# Patient Record
Sex: FEMALE | Race: OTHER | HISPANIC OR LATINO | URBAN - METROPOLITAN AREA
[De-identification: names, ages, dates, MRNs, and addresses within clinical notes are randomized per-mention and may not be internally consistent; named-entity substitution may affect disease eponyms.]

---

## 2021-07-30 PROBLEM — Z00.00 ENCOUNTER FOR PREVENTIVE HEALTH EXAMINATION: Status: ACTIVE | Noted: 2021-07-30

## 2021-07-31 ENCOUNTER — OUTPATIENT (OUTPATIENT)
Dept: OUTPATIENT SERVICES | Facility: HOSPITAL | Age: 71
LOS: 1 days | End: 2021-07-31
Payer: MEDICARE

## 2021-07-31 ENCOUNTER — APPOINTMENT (OUTPATIENT)
Dept: CT IMAGING | Facility: HOSPITAL | Age: 71
End: 2021-07-31
Payer: MEDICARE

## 2021-07-31 PROCEDURE — 74177 CT ABD & PELVIS W/CONTRAST: CPT | Mod: MH

## 2021-07-31 PROCEDURE — 71260 CT THORAX DX C+: CPT | Mod: 26,MH

## 2021-07-31 PROCEDURE — 74177 CT ABD & PELVIS W/CONTRAST: CPT | Mod: 26,MH

## 2021-07-31 PROCEDURE — 71260 CT THORAX DX C+: CPT | Mod: MH

## 2021-09-03 ENCOUNTER — OUTPATIENT (OUTPATIENT)
Dept: OUTPATIENT SERVICES | Facility: HOSPITAL | Age: 71
LOS: 1 days | End: 2021-09-03
Payer: MEDICARE

## 2021-09-03 LAB — GLUCOSE BLDC GLUCOMTR-MCNC: 84 MG/DL — SIGNIFICANT CHANGE UP (ref 70–99)

## 2021-09-03 PROCEDURE — 78815 PET IMAGE W/CT SKULL-THIGH: CPT | Mod: MH

## 2021-09-03 PROCEDURE — A9552: CPT

## 2021-09-03 PROCEDURE — 78815 PET IMAGE W/CT SKULL-THIGH: CPT | Mod: 26,MH

## 2021-09-03 PROCEDURE — 82962 GLUCOSE BLOOD TEST: CPT

## 2021-09-13 ENCOUNTER — OUTPATIENT (OUTPATIENT)
Dept: OUTPATIENT SERVICES | Facility: HOSPITAL | Age: 71
LOS: 1 days | End: 2021-09-13
Payer: MEDICARE

## 2021-09-13 ENCOUNTER — APPOINTMENT (OUTPATIENT)
Dept: MRI IMAGING | Facility: HOSPITAL | Age: 71
End: 2021-09-13
Payer: MEDICARE

## 2021-09-13 PROCEDURE — 74183 MRI ABD W/O CNTR FLWD CNTR: CPT | Mod: MH

## 2021-09-13 PROCEDURE — A9585: CPT

## 2021-09-13 PROCEDURE — 82565 ASSAY OF CREATININE: CPT

## 2021-09-13 PROCEDURE — 74183 MRI ABD W/O CNTR FLWD CNTR: CPT | Mod: 26,MH

## 2021-09-24 ENCOUNTER — OUTPATIENT (OUTPATIENT)
Dept: OUTPATIENT SERVICES | Facility: HOSPITAL | Age: 71
LOS: 1 days | End: 2021-09-24
Payer: MEDICARE

## 2021-09-24 ENCOUNTER — RESULT REVIEW (OUTPATIENT)
Age: 71
End: 2021-09-24

## 2021-09-24 ENCOUNTER — APPOINTMENT (OUTPATIENT)
Dept: INTERVENTIONAL RADIOLOGY/VASCULAR | Facility: HOSPITAL | Age: 71
End: 2021-09-24

## 2021-09-24 PROCEDURE — 76942 ECHO GUIDE FOR BIOPSY: CPT | Mod: 26

## 2021-09-24 PROCEDURE — 88360 TUMOR IMMUNOHISTOCHEM/MANUAL: CPT | Mod: 26

## 2021-09-24 PROCEDURE — 88307 TISSUE EXAM BY PATHOLOGIST: CPT | Mod: 26

## 2021-09-24 PROCEDURE — 88305 TISSUE EXAM BY PATHOLOGIST: CPT

## 2021-09-24 PROCEDURE — 88342 IMHCHEM/IMCYTCHM 1ST ANTB: CPT

## 2021-09-24 PROCEDURE — 88342 IMHCHEM/IMCYTCHM 1ST ANTB: CPT | Mod: 26,59

## 2021-09-24 PROCEDURE — 76942 ECHO GUIDE FOR BIOPSY: CPT

## 2021-09-24 PROCEDURE — 88360 TUMOR IMMUNOHISTOCHEM/MANUAL: CPT

## 2021-09-24 PROCEDURE — 47000 NEEDLE BIOPSY OF LIVER PERQ: CPT

## 2021-09-24 PROCEDURE — 88344 IMHCHEM/IMCYTCHM EA MLT ANTB: CPT

## 2021-09-24 PROCEDURE — 88172 CYTP DX EVAL FNA 1ST EA SITE: CPT | Mod: 26

## 2021-09-24 PROCEDURE — 88307 TISSUE EXAM BY PATHOLOGIST: CPT

## 2021-09-24 PROCEDURE — 99152 MOD SED SAME PHYS/QHP 5/>YRS: CPT

## 2021-09-24 PROCEDURE — 88172 CYTP DX EVAL FNA 1ST EA SITE: CPT

## 2021-09-24 PROCEDURE — 88341 IMHCHEM/IMCYTCHM EA ADD ANTB: CPT

## 2021-09-24 PROCEDURE — 88344 IMHCHEM/IMCYTCHM EA MLT ANTB: CPT | Mod: 26,59

## 2021-09-24 PROCEDURE — 88173 CYTOPATH EVAL FNA REPORT: CPT

## 2021-09-24 PROCEDURE — 88341 IMHCHEM/IMCYTCHM EA ADD ANTB: CPT | Mod: 26,59

## 2021-09-24 PROCEDURE — 88173 CYTOPATH EVAL FNA REPORT: CPT | Mod: 26

## 2021-09-24 PROCEDURE — 88305 TISSUE EXAM BY PATHOLOGIST: CPT | Mod: 26

## 2021-09-24 PROCEDURE — 99153 MOD SED SAME PHYS/QHP EA: CPT

## 2021-09-28 LAB
NON-GYNECOLOGICAL CYTOLOGY STUDY: SIGNIFICANT CHANGE UP
SURGICAL PATHOLOGY STUDY: SIGNIFICANT CHANGE UP

## 2022-02-02 ENCOUNTER — OUTPATIENT (OUTPATIENT)
Dept: OUTPATIENT SERVICES | Facility: HOSPITAL | Age: 72
LOS: 1 days | End: 2022-02-02
Payer: MEDICARE

## 2022-02-02 ENCOUNTER — APPOINTMENT (OUTPATIENT)
Dept: CT IMAGING | Facility: HOSPITAL | Age: 72
End: 2022-02-02

## 2022-02-02 PROCEDURE — 74178 CT ABD&PLV WO CNTR FLWD CNTR: CPT | Mod: 26,MH

## 2022-02-02 PROCEDURE — 82565 ASSAY OF CREATININE: CPT

## 2022-02-02 PROCEDURE — 74178 CT ABD&PLV WO CNTR FLWD CNTR: CPT | Mod: MH

## 2022-11-23 ENCOUNTER — INPATIENT (INPATIENT)
Facility: HOSPITAL | Age: 72
LOS: 1 days | Discharge: HOME | DRG: 809 | End: 2022-11-25
Attending: SPECIALIST | Admitting: SPECIALIST
Payer: MEDICARE

## 2022-11-23 VITALS
HEIGHT: 59 IN | SYSTOLIC BLOOD PRESSURE: 155 MMHG | WEIGHT: 128.09 LBS | OXYGEN SATURATION: 98 % | RESPIRATION RATE: 16 BRPM | TEMPERATURE: 98 F | HEART RATE: 103 BPM | DIASTOLIC BLOOD PRESSURE: 88 MMHG

## 2022-11-23 DIAGNOSIS — D61.810 ANTINEOPLASTIC CHEMOTHERAPY INDUCED PANCYTOPENIA: ICD-10-CM

## 2022-11-23 DIAGNOSIS — Z29.9 ENCOUNTER FOR PROPHYLACTIC MEASURES, UNSPECIFIED: ICD-10-CM

## 2022-11-23 DIAGNOSIS — C50.919 MALIGNANT NEOPLASM OF UNSPECIFIED SITE OF UNSPECIFIED FEMALE BREAST: ICD-10-CM

## 2022-11-23 DIAGNOSIS — Z98.890 OTHER SPECIFIED POSTPROCEDURAL STATES: Chronic | ICD-10-CM

## 2022-11-23 LAB
ALBUMIN SERPL ELPH-MCNC: 3.1 G/DL — LOW (ref 3.3–5)
ALP SERPL-CCNC: 140 U/L — HIGH (ref 40–120)
ALT FLD-CCNC: SIGNIFICANT CHANGE UP (ref 10–45)
ANION GAP SERPL CALC-SCNC: 7 MMOL/L — SIGNIFICANT CHANGE UP (ref 5–17)
ANION GAP SERPL CALC-SCNC: 8 MMOL/L — SIGNIFICANT CHANGE UP (ref 5–17)
ANISOCYTOSIS BLD QL: SIGNIFICANT CHANGE UP
APPEARANCE UR: CLEAR — SIGNIFICANT CHANGE UP
APTT BLD: 28.5 SEC — SIGNIFICANT CHANGE UP (ref 27.5–35.5)
AST SERPL-CCNC: SIGNIFICANT CHANGE UP (ref 10–40)
BACTERIA # UR AUTO: PRESENT /HPF
BASOPHILS # BLD AUTO: 0.03 K/UL — SIGNIFICANT CHANGE UP (ref 0–0.2)
BASOPHILS NFR BLD AUTO: 1.8 % — SIGNIFICANT CHANGE UP (ref 0–2)
BILIRUB SERPL-MCNC: 0.8 MG/DL — SIGNIFICANT CHANGE UP (ref 0.2–1.2)
BILIRUB UR-MCNC: NEGATIVE — SIGNIFICANT CHANGE UP
BLD GP AB SCN SERPL QL: NEGATIVE — SIGNIFICANT CHANGE UP
BUN SERPL-MCNC: 12 MG/DL — SIGNIFICANT CHANGE UP (ref 7–23)
BUN SERPL-MCNC: 12 MG/DL — SIGNIFICANT CHANGE UP (ref 7–23)
CALCIUM SERPL-MCNC: 8 MG/DL — LOW (ref 8.4–10.5)
CALCIUM SERPL-MCNC: 8.7 MG/DL — SIGNIFICANT CHANGE UP (ref 8.4–10.5)
CHLORIDE SERPL-SCNC: 103 MMOL/L — SIGNIFICANT CHANGE UP (ref 96–108)
CHLORIDE SERPL-SCNC: 103 MMOL/L — SIGNIFICANT CHANGE UP (ref 96–108)
CO2 SERPL-SCNC: 22 MMOL/L — SIGNIFICANT CHANGE UP (ref 22–31)
CO2 SERPL-SCNC: 24 MMOL/L — SIGNIFICANT CHANGE UP (ref 22–31)
COD CRY URNS QL: ABNORMAL /HPF
COLOR SPEC: YELLOW — SIGNIFICANT CHANGE UP
COMMENT - URINE: SIGNIFICANT CHANGE UP
CREAT SERPL-MCNC: 0.54 MG/DL — SIGNIFICANT CHANGE UP (ref 0.5–1.3)
CREAT SERPL-MCNC: 0.55 MG/DL — SIGNIFICANT CHANGE UP (ref 0.5–1.3)
DACRYOCYTES BLD QL SMEAR: SLIGHT — SIGNIFICANT CHANGE UP
DIFF PNL FLD: ABNORMAL
EGFR: 97 ML/MIN/1.73M2 — SIGNIFICANT CHANGE UP
EGFR: 98 ML/MIN/1.73M2 — SIGNIFICANT CHANGE UP
EOSINOPHIL # BLD AUTO: 0.04 K/UL — SIGNIFICANT CHANGE UP (ref 0–0.5)
EOSINOPHIL NFR BLD AUTO: 2.6 % — SIGNIFICANT CHANGE UP (ref 0–6)
EPI CELLS # UR: SIGNIFICANT CHANGE UP /HPF (ref 0–5)
GIANT PLATELETS BLD QL SMEAR: PRESENT — SIGNIFICANT CHANGE UP
GLUCOSE SERPL-MCNC: 153 MG/DL — HIGH (ref 70–99)
GLUCOSE SERPL-MCNC: 99 MG/DL — SIGNIFICANT CHANGE UP (ref 70–99)
GLUCOSE UR QL: NEGATIVE — SIGNIFICANT CHANGE UP
HCT VFR BLD CALC: 29.5 % — LOW (ref 34.5–45)
HGB BLD-MCNC: 10.4 G/DL — LOW (ref 11.5–15.5)
INR BLD: 1.51 — HIGH (ref 0.88–1.16)
KETONES UR-MCNC: NEGATIVE — SIGNIFICANT CHANGE UP
LEUKOCYTE ESTERASE UR-ACNC: NEGATIVE — SIGNIFICANT CHANGE UP
LYMPHOCYTES # BLD AUTO: 0.85 K/UL — LOW (ref 1–3.3)
LYMPHOCYTES # BLD AUTO: 56.5 % — HIGH (ref 13–44)
MACROCYTES BLD QL: SIGNIFICANT CHANGE UP
MANUAL SMEAR VERIFICATION: SIGNIFICANT CHANGE UP
MCHC RBC-ENTMCNC: 35.3 GM/DL — SIGNIFICANT CHANGE UP (ref 32–36)
MCHC RBC-ENTMCNC: 38.7 PG — HIGH (ref 27–34)
MCV RBC AUTO: 109.7 FL — HIGH (ref 80–100)
MONOCYTES # BLD AUTO: 0.04 K/UL — SIGNIFICANT CHANGE UP (ref 0–0.9)
MONOCYTES NFR BLD AUTO: 2.6 % — SIGNIFICANT CHANGE UP (ref 2–14)
NEUTROPHILS # BLD AUTO: 0.55 K/UL — LOW (ref 1.8–7.4)
NEUTROPHILS NFR BLD AUTO: 31.3 % — LOW (ref 43–77)
NEUTS BAND # BLD: 5.2 % — SIGNIFICANT CHANGE UP (ref 0–8)
NITRITE UR-MCNC: NEGATIVE — SIGNIFICANT CHANGE UP
OVALOCYTES BLD QL SMEAR: SLIGHT — SIGNIFICANT CHANGE UP
PH UR: 6 — SIGNIFICANT CHANGE UP (ref 5–8)
PLAT MORPH BLD: ABNORMAL
PLATELET # BLD AUTO: 12 K/UL — CRITICAL LOW (ref 150–400)
POIKILOCYTOSIS BLD QL AUTO: SLIGHT — SIGNIFICANT CHANGE UP
POLYCHROMASIA BLD QL SMEAR: SLIGHT — SIGNIFICANT CHANGE UP
POTASSIUM SERPL-MCNC: 3.4 MMOL/L — LOW (ref 3.5–5.3)
POTASSIUM SERPL-MCNC: SIGNIFICANT CHANGE UP (ref 3.5–5.3)
POTASSIUM SERPL-MCNC: SIGNIFICANT CHANGE UP MMOL/L (ref 3.5–5.3)
POTASSIUM SERPL-SCNC: 3.4 MMOL/L — LOW (ref 3.5–5.3)
POTASSIUM SERPL-SCNC: SIGNIFICANT CHANGE UP (ref 3.5–5.3)
POTASSIUM SERPL-SCNC: SIGNIFICANT CHANGE UP MMOL/L (ref 3.5–5.3)
PROT SERPL-MCNC: 6.5 G/DL — SIGNIFICANT CHANGE UP (ref 6–8.3)
PROT UR-MCNC: NEGATIVE MG/DL — SIGNIFICANT CHANGE UP
PROTHROM AB SERPL-ACNC: 18 SEC — HIGH (ref 10.5–13.4)
RBC # BLD: 2.69 M/UL — LOW (ref 3.8–5.2)
RBC # FLD: 15.7 % — HIGH (ref 10.3–14.5)
RBC BLD AUTO: ABNORMAL
RBC CASTS # UR COMP ASSIST: > 10 /HPF
RH IG SCN BLD-IMP: POSITIVE — SIGNIFICANT CHANGE UP
SARS-COV-2 RNA SPEC QL NAA+PROBE: NEGATIVE — SIGNIFICANT CHANGE UP
SMUDGE CELLS # BLD: PRESENT — SIGNIFICANT CHANGE UP
SODIUM SERPL-SCNC: 132 MMOL/L — LOW (ref 135–145)
SODIUM SERPL-SCNC: 135 MMOL/L — SIGNIFICANT CHANGE UP (ref 135–145)
SP GR SPEC: 1.01 — SIGNIFICANT CHANGE UP (ref 1–1.03)
UROBILINOGEN FLD QL: 0.2 E.U./DL — SIGNIFICANT CHANGE UP
WBC # BLD: 1.5 K/UL — LOW (ref 3.8–10.5)
WBC # FLD AUTO: 1.5 K/UL — LOW (ref 3.8–10.5)
WBC UR QL: < 5 /HPF — SIGNIFICANT CHANGE UP

## 2022-11-23 PROCEDURE — 99285 EMERGENCY DEPT VISIT HI MDM: CPT | Mod: FS

## 2022-11-23 PROCEDURE — 70450 CT HEAD/BRAIN W/O DYE: CPT | Mod: 26,MA

## 2022-11-23 RX ORDER — LANOLIN ALCOHOL/MO/W.PET/CERES
3 CREAM (GRAM) TOPICAL AT BEDTIME
Refills: 0 | Status: DISCONTINUED | OUTPATIENT
Start: 2022-11-23 | End: 2022-11-25

## 2022-11-23 RX ORDER — ACETAMINOPHEN 500 MG
650 TABLET ORAL EVERY 6 HOURS
Refills: 0 | Status: DISCONTINUED | OUTPATIENT
Start: 2022-11-23 | End: 2022-11-25

## 2022-11-23 RX ORDER — INFLUENZA VIRUS VACCINE 15; 15; 15; 15 UG/.5ML; UG/.5ML; UG/.5ML; UG/.5ML
0.7 SUSPENSION INTRAMUSCULAR ONCE
Refills: 0 | Status: DISCONTINUED | OUTPATIENT
Start: 2022-11-23 | End: 2022-11-25

## 2022-11-23 RX ORDER — POTASSIUM CHLORIDE 20 MEQ
40 PACKET (EA) ORAL ONCE
Refills: 0 | Status: COMPLETED | OUTPATIENT
Start: 2022-11-23 | End: 2022-11-23

## 2022-11-23 RX ORDER — ONDANSETRON 8 MG/1
4 TABLET, FILM COATED ORAL EVERY 8 HOURS
Refills: 0 | Status: DISCONTINUED | OUTPATIENT
Start: 2022-11-23 | End: 2022-11-25

## 2022-11-23 RX ADMIN — Medication 40 MILLIEQUIVALENT(S): at 19:46

## 2022-11-23 NOTE — H&P ADULT - PROBLEM SELECTOR PLAN 1
Patient with history of chemotherapy with ibrance which is likely the cause of her pancytopenia. Was on leukine x5 day course. Plt count on admission 12 and is s/p 1u platelet transfusion. WBC of 1.50 w/ ANC of 547 on admission, no fever noted. Exam w/o focal findings, currently no hematuria in hospital. Head CT negative.     #Thrombocytopenia   - s/p platelet transfusion - monitor for transfusion reaction  - Tranfuse <10K, <20k w/ fever, 50K w/ active bleeding   - Strict bed rest - fall precautions i/s/o significant thrombocytopenia   - Heme/Onc Dr. Terry to see patient for possible BM biopsy this admission     #Neutropenia - patient w/ neutropenia w/ ANC of 547 on admission. Afebrile w/ nonfocal exam  - Daily CBC w/ differential   - No rectal temperatures   - Start pseudomonal antibiotic coverage w/ zosyn if ANC drops below 500 or develop fever single oral Temp of 101F or 100.4F for >/=1hr (at the moment low risk - no symptoms, afebrile, no tachypnea, no hypotension, no signs of dehydration, Kidney and liver function normal)    #Anemia - presents with Hgb of 10.4, .7, no signs of active bleeding   - Maintain active T&S  - Tranfuse less than 7   - FU morning labs - anemia workup, hemolytic workup

## 2022-11-23 NOTE — ED PROVIDER NOTE - NS ED ATTENDING STATEMENT MOD
This was a shared visit with the NATHALY. I reviewed and verified the documentation and independently performed the documented:

## 2022-11-23 NOTE — H&P ADULT - ASSESSMENT
Mrs. 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 Garcia for lab work today showing pancytopenia w/ a plt count of 12 s/p platelet transfusion.

## 2022-11-23 NOTE — H&P ADULT - PROBLEM SELECTOR PLAN 2
History of breast CA w/ possible liver mets. osteoblastic bone disease. Followed by Dr. Garcia currently not on chemotherapy regimen.     Plan:   - As above   - Possible IR bone marrow biopsy

## 2022-11-23 NOTE — ED PROVIDER NOTE - PHYSICAL EXAMINATION
Vitals reviewed  Gen: well appearing, nad, speaking in full sentences  Skin: wwp, no rash/lesions  HEENT: ncat, eomi, mmm  CV: tachycardia, regular rhythm, no audible m/r/g  Resp: symmetrical expansion, ctab, no w/r/r  Abd: nondistended, soft/nt, no r/g  Ext: FROM throughout, no peripheral edema  Neuro: alert/oriented, no focal deficits, steady gait

## 2022-11-23 NOTE — ED PROVIDER NOTE - OBJECTIVE STATEMENT
72 F pmh breast CA ?liver mets currently on chemo (last chemo 2 weeks ago), meningioma s/p surgery referred to ED for neutropenia and thrombocytopenia on outpt labs today.  pt reports last Thursday her wbc and plt were low, started on leukine x 5 doses (thurs/fri and mon-today).  had rpt labs today and noted wbc dropped to 1.8 and plt to 11.  sent in for plt transfusion.  pt reports episode of hematuria this morning and chronic easy bruising but denies bleeding gums, melena/hematochezia, f/c, dizziness/HA, chest pain, sob, abd pain, nvd, dysuria/urinary frequency/urgency, numbness/weakness, fall/trauma 72 F pmh breast CA ?liver mets currently on chemo (last chemo 2 weeks ago), meningioma s/p surgery referred to ED for neutropenia and thrombocytopenia on outpt labs today.  pt reports last Thursday her wbc and plt were low, started on leukine x 5 doses (thurs/fri and mon-today).  had rpt labs today and noted wbc dropped to 1.8 and plt to 11.  sent in for plt transfusion, Cecy was dc one week ago.  pt reports episode of hematuria this morning and chronic easy bruising but denies bleeding gums, melena/hematochezia, f/c, dizziness/HA, chest pain, sob, abd pain, nvd, dysuria/urinary frequency/urgency, numbness/weakness, fall/trauma

## 2022-11-23 NOTE — ED PROVIDER NOTE - CLINICAL SUMMARY MEDICAL DECISION MAKING FREE TEXT BOX
72 F pmh breast CA ?liver mets currently on chemo (last chemo 2 weeks ago), meningioma s/p surgery referred to ED for neutropenia 1.8 and thrombocytopenia 11 on outpt labs today.  reports hematuria this morning x 1 episode, no other active bleeding.  on exam afebrile, well appearing, lungs ctab, mild tachy, abd soft/nt.  will obtain labs, CT head to r/o bleed although no neuro sxs or HA and plan to c/s heme pending rst.   d/w Dr. Colby (covering for Dr. Garcia) and agrees with plan 72 F pmh breast CA ?liver mets currently on chemo (last chemo 2 weeks ago), meningioma s/p surgery referred to ED for neutropenia 1.8 and thrombocytopenia 11 on outpt labs today.  reports hematuria this morning x 1 episode, no other active bleeding.  on exam afebrile, well appearing, lungs ctab, mild tachy, abd soft/nt.  will obtain labs, CT head to r/o bleed although no neuro sxs or HA and plan to c/s heme pending rst

## 2022-11-23 NOTE — CONSULT NOTE ADULT - ASSESSMENT
Pancytopenia to various degree over the past few months  --At this point warrants a bone marrow biopsy, will arrange for 11/25/2022  --Supportive care  --Keep platelets above 10K and 20K if bleeding (hematuria)   --Pancytopenia may be 2/2 Ibrance on hold since 11/16/2022  --My need to consider GCSF support, on intermittent support as outpatient, last dose of Leukine 11/23/2022  --S/p 1 unit of platelet transfusion on 11/23/2022    Metastatic breast cancer   --Hold Ibrance, AI  --Will resume mgs as outpatient     Case was discussed with Dr. Garcia and Dr. Perez  Will follow

## 2022-11-23 NOTE — CONSULT NOTE ADULT - SUBJECTIVE AND OBJECTIVE BOX
Patient is a 72y old  Female who presents with a chief complaint of Pancytopenia (23 Nov 2022 17:58)        HPI:  Mrs. 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 Garcia for lab work today showing pancytopenia w/ a plt count of 12. Patient is currently denying any complaints at the time, other than hematuria this AM currently denying any signs of bleeding. Patient was being treated w/ ibrance for breast cancer which was discontinued she states approx 2 weeks ago 2/2 patient tolerance. On 11/21 patient was found to be neutropenic and started on Leukine x5 doses. She went to have blood work drawn today and found to have a plt count of 11. Patient otherwise feels at her USOH other than mild peripheral bruising which she states is not new. She otherwise denies bleeding gums, melena, hematochezia, fever, chills, dizziness, HA, chest pain, SOB, n/v/d, dysuria or recent fall/trauma.     ED: VSS - 98.7F oral temp, 94 HR, 126/75, 97% RA.   Labs s/f: WBC 1.50, Neutrophil %31.3, Bands 5.2 - , Hgb 10.4, Plt 12, Na 135, K 3.4, BUN/Cr 12/0.55, Albumin 3.1, Alk phos 140, AST/ALT clotted  UA negative  COVID negative   CTH - no bleed   Interventions: 1u Plt   (23 Nov 2022 17:58)           PAST MEDICAL & SURGICAL HISTORY:  Breast cancer      Meningioma      S/P resection of meningioma            FAMILY HISTORY:      Social History:    Allergies    No Known Allergies    Intolerances        MEDICATIONS  (STANDING):  influenza  Vaccine (HIGH DOSE) 0.7 milliLiter(s) IntraMuscular once    MEDICATIONS  (PRN):  acetaminophen     Tablet .. 650 milliGRAM(s) Oral every 6 hours PRN Temp greater or equal to 38C (100.4F), Mild Pain (1 - 3)  aluminum hydroxide/magnesium hydroxide/simethicone Suspension 30 milliLiter(s) Oral every 4 hours PRN Dyspepsia  melatonin 3 milliGRAM(s) Oral at bedtime PRN Insomnia  ondansetron Injectable 4 milliGRAM(s) IV Push every 8 hours PRN Nausea and/or Vomiting      Vital Signs Last 24 Hrs  T(C): 36.8 (23 Nov 2022 20:45), Max: 37.2 (23 Nov 2022 14:17)  T(F): 98.2 (23 Nov 2022 20:45), Max: 99 (23 Nov 2022 14:17)  HR: 90 (23 Nov 2022 20:45) (90 - 103)  BP: 150/79 (23 Nov 2022 20:45) (120/71 - 155/88)  BP(mean): --  RR: 17 (23 Nov 2022 20:45) (16 - 18)  SpO2: 96% (23 Nov 2022 20:45) (96% - 98%)    Parameters below as of 23 Nov 2022 20:45  Patient On (Oxygen Delivery Method): room air        ROS:  Negative except for:    PHYSICAL EXAM  General: adult in NAD  HEENT: clear oropharynx, anicteric sclera, pink conjunctiva  Neck: supple  CV: normal S1/S2 with no murmur rubs or gallops  Lungs: positive air movement b/l ant lungs,clear to auscultation, no wheezes, no rales  Abdomen: soft non-tender non-distended, no hepatosplenomegaly  Ext: no clubbing cyanosis or edema  Skin: no rashes and no petechiae  Neuro: alert and oriented X 4, no focal deficits      LABS:                          10.4   1.50  )-----------( 12       ( 23 Nov 2022 13:19 )             29.5         Mean Cell Volume : 109.7 fl  Mean Cell Hemoglobin : 38.7 pg  Mean Cell Hemoglobin Concentration : 35.3 gm/dL  Auto Neutrophil # : 0.55 K/uL  Auto Lymphocyte # : 0.85 K/uL  Auto Monocyte # : 0.04 K/uL  Auto Eosinophil # : 0.04 K/uL  Auto Basophil # : 0.03 K/uL  Auto Neutrophil % : 31.3 %  Auto Lymphocyte % : 56.5 %  Auto Monocyte % : 2.6 %  Auto Eosinophil % : 2.6 %  Auto Basophil % : 1.8 %      Serial CBC's  11-23 @ 13:19  Hct-29.5 / Hgb-10.4 / Plat-12 / RBC-2.69 / WBC-1.50      11-23    x   |  x   |  x   ----------------------------<  x   3.4<L>   |  x   |  x     Ca    8.7      23 Nov 2022 13:19    TPro  6.5  /  Alb  3.1<L>  /  TBili  0.8  /  DBili  x   /  AST  See Note  /  ALT  See Note  /  AlkPhos  140<H>  11-23      PT/INR - ( 23 Nov 2022 13:19 )   PT: 18.0 sec;   INR: 1.51          PTT - ( 23 Nov 2022 13:19 )  PTT:28.5 sec              LIVER FUNCTIONS - ( 23 Nov 2022 11:50 )  Alb: 3.1 g/dL / Pro: 6.5 g/dL / ALK PHOS: 140 U/L / ALT: See Note / AST: See Note / GGT: x           BLOOD SMEAR INTERPRETATION:       RADIOLOGY & ADDITIONAL STUDIES:    Assessment And Plan:

## 2022-11-23 NOTE — PATIENT PROFILE ADULT - FUNCTIONAL ASSESSMENT - BASIC MOBILITY 6.
4-calculated by average/Not able to assess (calculate score using [facility] averaging method)

## 2022-11-23 NOTE — ED ADULT NURSE NOTE - OBJECTIVE STATEMENT
71yo pt with pmh breast CA, referred by PCP for platelet count of 11. c/o hematuria onset saturday, and abnormal bruising. denies dizziness, weakness, blood in stool.

## 2022-11-23 NOTE — H&P ADULT - NSHPPHYSICALEXAM_GEN_ALL_CORE
.  VITAL SIGNS:  T(C): 36.9 (11-23-22 @ 16:50), Max: 37.2 (11-23-22 @ 14:17)  T(F): 98.5 (11-23-22 @ 16:50), Max: 99 (11-23-22 @ 14:17)  HR: 92 (11-23-22 @ 16:50) (91 - 103)  BP: 120/71 (11-23-22 @ 16:50) (120/71 - 155/88)  BP(mean): --  RR: 16 (11-23-22 @ 16:50) (16 - 18)  SpO2: 98% (11-23-22 @ 16:50) (96% - 98%)  Wt(kg): --    PHYSICAL EXAM:    Constitutional: resting comfortably in bed; NAD  Head: NC/AT  Eyes: PERRL, EOMI, anicteric sclera  ENT: no nasal discharge; MMM, no gum bleeding   Neck: supple; no JVD   Respiratory: CTA B/L; no W/R/R, no retractions  Cardiac: +S1/S2; RRR; no M/R/G;   Gastrointestinal: abdomen soft, NT/ND; no rebound or guarding; +BSx4  Back: spine midline, no bony tenderness or step-offs; no CVAT B/L  Extremities: WWP, no clubbing or cyanosis; no peripheral edema  Musculoskeletal: NROM x4; no joint swelling, tenderness or erythema, no significant bruising, no signs of hematoma on exam   Vascular: 2+ radial, DP pulses B/L  Dermatologic: skin warm, dry and intact; no rashes, wounds, or scars  Lymphatic: no submandibular or cervical LAD  Neurologic: AAOx3; CNII-XII grossly intact; no focal deficits  Psychiatric: affect and characteristics of appearance, verbalizations, behaviors are appropriate

## 2022-11-23 NOTE — ED ADULT TRIAGE NOTE - CHIEF COMPLAINT QUOTE
Pt sent in by Dr. Garcia for platelet count of 11. Denies abdominal pain, rectal bleeding, blood in urine. Currently being treated for breast cancer.

## 2022-11-23 NOTE — ED PROVIDER NOTE - PROGRESS NOTE DETAILS
d/w Dr. Min and recommend plt transfusion 1 unit and dc after.  has leukine scheduled for tomorrow.  likely pancytopenia from Ibrance.  UA + blood but visualized and not [place] hematuria d/w Dr. iMn and recommend plt transfusion 1 unit and admit to Dr. Perez.  possible pancytopenia from Ibrance.  wants bone marrow biopsy inpt.  UA + blood but visualized and not [place] hematuria.  Dr. Perez aware, consented for plt and ordered. rpt bmp pending

## 2022-11-23 NOTE — ED PROVIDER NOTE - ATTENDING APP SHARED VISIT CONTRIBUTION OF CARE
72 y/ F with PMH breast cancer on chemo presents to the ED with neutropenia/thrombocytopenia on outpatient labs. Patient is currently on leukine. She is endorsing hematuria and easy bruising.  In the  ED, she is mildly tachycardic but otherwise hemodynamically stable. Blood counts likely response to chemo. Will check labs including coags and type/screen, will obtain CT head given significant thrombocytopenia to r/o occult bleed, will discuss with heme/onc regarding transfusion and disposition.

## 2022-11-23 NOTE — H&P ADULT - HISTORY OF PRESENT ILLNESS
Mrs. 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 Garcia for lab work today showing pancytopenia w/ a plt count of 12. Patient is currently denying any complaints at the time, other than hematuria this AM currently denying any signs of bleeding. Patient was being treated w/ ibrance for breast cancer which was discontinued she states approx 2 weeks ago 2/2 patient tolerance. On 11/21 patient was found to be neutropenic and started on Leukine x5 doses. She went to have blood work drawn today and found to have a plt count of 11. Patient otherwise feels at her USOH other than mild peripheral bruising which she states is not new. She otherwise denies bleeding gums, melena, hematochezia, fever, chills, dizziness, HA, chest pain, SOB, n/v/d, dysuria or recent fall/trauma.     ED: VSS - 98.7F oral temp, 94 HR, 126/75, 97% RA.   Labs s/f: WBC 1.50, Neutrophil %31.3, Bands 5.2 - , Hgb 10.4, Plt 12, Na 135, K 3.4, BUN/Cr 12/0.55, Albumin 3.1, Alk phos 140, AST/ALT clotted  UA negative  COVID negative   CTH - no bleed   Interventions: 1u Plt

## 2022-11-23 NOTE — H&P ADULT - NSHPLABSRESULTS_GEN_ALL_CORE
.  LABS:                         10.4   1.50  )-----------( 12       ( 2022 13:19 )             29.5         x   |  x   |  x   ----------------------------<  x   3.4<L>   |  x   |  x     Ca    8.7      2022 13:19    TPro  6.5  /  Alb  3.1<L>  /  TBili  0.8  /  DBili  x   /  AST  See Note  /  ALT  See Note  /  AlkPhos  140<H>      PT/INR - ( 2022 13:19 )   PT: 18.0 sec;   INR: 1.51          PTT - ( 2022 13:19 )  PTT:28.5 sec  Urinalysis Basic - ( 2022 13:28 )    Color: Yellow / Appearance: Clear / S.015 / pH: x  Gluc: x / Ketone: NEGATIVE  / Bili: Negative / Urobili: 0.2 E.U./dL   Blood: x / Protein: NEGATIVE mg/dL / Nitrite: NEGATIVE   Leuk Esterase: NEGATIVE / RBC: > 10 /HPF / WBC < 5 /HPF   Sq Epi: x / Non Sq Epi: 0-5 /HPF / Bacteria: Present /HPF                RADIOLOGY, EKG & ADDITIONAL TESTS: Reviewed.

## 2022-11-23 NOTE — ED PROVIDER NOTE - IV ALTEPLASE INCLUSION HIDDEN
Quality 110: Preventive Care And Screening: Influenza Immunization: Influenza Immunization previously received during influenza season Quality 111:Pneumonia Vaccination Status For Older Adults: Pneumococcal Vaccination Previously Received Detail Level: Detailed show

## 2022-11-24 LAB
ALBUMIN SERPL ELPH-MCNC: 3.3 G/DL — SIGNIFICANT CHANGE UP (ref 3.3–5)
ALP SERPL-CCNC: 135 U/L — HIGH (ref 40–120)
ALT FLD-CCNC: 20 U/L — SIGNIFICANT CHANGE UP (ref 10–45)
ANION GAP SERPL CALC-SCNC: 6 MMOL/L — SIGNIFICANT CHANGE UP (ref 5–17)
ANISOCYTOSIS BLD QL: SLIGHT — SIGNIFICANT CHANGE UP
APTT BLD: 29.5 SEC — SIGNIFICANT CHANGE UP (ref 27.5–35.5)
AST SERPL-CCNC: 26 U/L — SIGNIFICANT CHANGE UP (ref 10–40)
BASOPHILS # BLD AUTO: 0 K/UL — SIGNIFICANT CHANGE UP (ref 0–0.2)
BASOPHILS NFR BLD AUTO: 0 % — SIGNIFICANT CHANGE UP (ref 0–2)
BILIRUB SERPL-MCNC: 1.3 MG/DL — HIGH (ref 0.2–1.2)
BUN SERPL-MCNC: 9 MG/DL — SIGNIFICANT CHANGE UP (ref 7–23)
CALCIUM SERPL-MCNC: 8.8 MG/DL — SIGNIFICANT CHANGE UP (ref 8.4–10.5)
CHLORIDE SERPL-SCNC: 107 MMOL/L — SIGNIFICANT CHANGE UP (ref 96–108)
CO2 SERPL-SCNC: 24 MMOL/L — SIGNIFICANT CHANGE UP (ref 22–31)
CREAT SERPL-MCNC: 0.54 MG/DL — SIGNIFICANT CHANGE UP (ref 0.5–1.3)
DACRYOCYTES BLD QL SMEAR: SLIGHT — SIGNIFICANT CHANGE UP
EGFR: 98 ML/MIN/1.73M2 — SIGNIFICANT CHANGE UP
EOSINOPHIL # BLD AUTO: 0.08 K/UL — SIGNIFICANT CHANGE UP (ref 0–0.5)
EOSINOPHIL NFR BLD AUTO: 4.5 % — SIGNIFICANT CHANGE UP (ref 0–6)
FERRITIN SERPL-MCNC: 143 NG/ML — SIGNIFICANT CHANGE UP (ref 15–150)
GIANT PLATELETS BLD QL SMEAR: PRESENT — SIGNIFICANT CHANGE UP
GLUCOSE BLDC GLUCOMTR-MCNC: 224 MG/DL — HIGH (ref 70–99)
GLUCOSE SERPL-MCNC: 92 MG/DL — SIGNIFICANT CHANGE UP (ref 70–99)
HAPTOGLOB SERPL-MCNC: SIGNIFICANT CHANGE UP (ref 34–200)
HCT VFR BLD CALC: 29.3 % — LOW (ref 34.5–45)
HCV AB S/CO SERPL IA: 0.06 S/CO — SIGNIFICANT CHANGE UP
HCV AB SERPL-IMP: SIGNIFICANT CHANGE UP
HGB BLD-MCNC: 9.8 G/DL — LOW (ref 11.5–15.5)
INR BLD: 1.46 — HIGH (ref 0.88–1.16)
IRON SATN MFR SERPL: 51 % — HIGH (ref 14–50)
IRON SATN MFR SERPL: 94 UG/DL — SIGNIFICANT CHANGE UP (ref 30–160)
LDH SERPL L TO P-CCNC: 186 U/L — SIGNIFICANT CHANGE UP (ref 50–242)
LYMPHOCYTES # BLD AUTO: 0.61 K/UL — LOW (ref 1–3.3)
LYMPHOCYTES # BLD AUTO: 32.7 % — SIGNIFICANT CHANGE UP (ref 13–44)
MACROCYTES BLD QL: SLIGHT — SIGNIFICANT CHANGE UP
MANUAL SMEAR VERIFICATION: SIGNIFICANT CHANGE UP
MCHC RBC-ENTMCNC: 33.4 GM/DL — SIGNIFICANT CHANGE UP (ref 32–36)
MCHC RBC-ENTMCNC: 38.6 PG — HIGH (ref 27–34)
MCV RBC AUTO: 115.4 FL — HIGH (ref 80–100)
MONOCYTES # BLD AUTO: 0.22 K/UL — SIGNIFICANT CHANGE UP (ref 0–0.9)
MONOCYTES NFR BLD AUTO: 11.8 % — SIGNIFICANT CHANGE UP (ref 2–14)
NEUTROPHILS # BLD AUTO: 0.96 K/UL — LOW (ref 1.8–7.4)
NEUTROPHILS NFR BLD AUTO: 45.5 % — SIGNIFICANT CHANGE UP (ref 43–77)
NEUTS BAND # BLD: 5.5 % — SIGNIFICANT CHANGE UP (ref 0–8)
OVALOCYTES BLD QL SMEAR: SLIGHT — SIGNIFICANT CHANGE UP
PLAT MORPH BLD: ABNORMAL
PLATELET # BLD AUTO: 32 K/UL — LOW (ref 150–400)
POIKILOCYTOSIS BLD QL AUTO: SLIGHT — SIGNIFICANT CHANGE UP
POLYCHROMASIA BLD QL SMEAR: SLIGHT — SIGNIFICANT CHANGE UP
POTASSIUM SERPL-MCNC: 4.1 MMOL/L — SIGNIFICANT CHANGE UP (ref 3.5–5.3)
POTASSIUM SERPL-SCNC: 4.1 MMOL/L — SIGNIFICANT CHANGE UP (ref 3.5–5.3)
PROT SERPL-MCNC: 6.6 G/DL — SIGNIFICANT CHANGE UP (ref 6–8.3)
PROTHROM AB SERPL-ACNC: 17.4 SEC — HIGH (ref 10.5–13.4)
RBC # BLD: 2.54 M/UL — LOW (ref 3.8–5.2)
RBC # BLD: 2.54 M/UL — LOW (ref 3.8–5.2)
RBC # FLD: 16 % — HIGH (ref 10.3–14.5)
RBC BLD AUTO: ABNORMAL
RETICS #: 47.8 K/UL — SIGNIFICANT CHANGE UP (ref 25–125)
RETICS/RBC NFR: 1.9 % — SIGNIFICANT CHANGE UP (ref 0.5–2.5)
SMUDGE CELLS # BLD: PRESENT — SIGNIFICANT CHANGE UP
SODIUM SERPL-SCNC: 137 MMOL/L — SIGNIFICANT CHANGE UP (ref 135–145)
TIBC SERPL-MCNC: 185 UG/DL — LOW (ref 220–430)
TRANSFERRIN SERPL-MCNC: 170 MG/DL — LOW (ref 200–360)
UIBC SERPL-MCNC: 91 UG/DL — LOW (ref 110–370)
WBC # BLD: 1.88 K/UL — LOW (ref 3.8–10.5)
WBC # FLD AUTO: 1.88 K/UL — LOW (ref 3.8–10.5)

## 2022-11-24 NOTE — PROGRESS NOTE ADULT - PROBLEM SELECTOR PLAN 3
F: none   E: Replete prn   DVT: SCDs  Diet: Regular  Code: Full  Dispo: [facility]

## 2022-11-24 NOTE — PROGRESS NOTE ADULT - PROBLEM SELECTOR PLAN 1
Patient with history of chemotherapy with ibrance which is likely the cause of her pancytopenia. Was on leukine x5 day course. Plt count on admission 12 and is s/p 1u platelet transfusion. WBC of 1.50 w/ ANC of 547 on admission, no fever noted. Exam w/o focal findings, currently no hematuria in hospital. Head CT negative.     #Thrombocytopenia   - s/p platelet transfusion - monitor for transfusion reaction  --Keep platelets above 10K and 20K if bleeding (hematuria)    - Strict bed rest - fall precautions i/s/o significant thrombocytopenia  --Pancytopenia may be 2/2 Ibrance on hold since 11/16/2022  --My need to consider GCSF support, on intermittent support as outpatient, last dose of Leukine 11/23/2022  --S/p 1 unit of platelet transfusion on 11/23/2022    #Neutropenia - patient w/ neutropenia w/ ANC of 547 on admission. Afebrile w/ nonfocal exam  - Daily CBC w/ differential   - No rectal temperatures   - Start pseudomonal antibiotic coverage w/ zosyn if ANC drops below 500 or develop fever single oral Temp of 101F or 100.4F for >/=1hr (at the moment low risk - no symptoms, afebrile, no tachypnea, no hypotension, no signs of dehydration, Kidney and liver function normal)    #Anemia - presents with Hgb of 10.4, .7, no signs of active bleeding   - Maintain active T&S  - Tranfuse less than 7   - FU morning labs - anemia workup, hemolytic workup

## 2022-11-25 ENCOUNTER — TRANSCRIPTION ENCOUNTER (OUTPATIENT)
Age: 72
End: 2022-11-25

## 2022-11-25 VITALS
TEMPERATURE: 98 F | RESPIRATION RATE: 18 BRPM | SYSTOLIC BLOOD PRESSURE: 132 MMHG | OXYGEN SATURATION: 98 % | DIASTOLIC BLOOD PRESSURE: 81 MMHG | HEART RATE: 91 BPM

## 2022-11-25 LAB
ALBUMIN SERPL ELPH-MCNC: 3.4 G/DL — SIGNIFICANT CHANGE UP (ref 3.3–5)
ALP SERPL-CCNC: 146 U/L — HIGH (ref 40–120)
ALT FLD-CCNC: 22 U/L — SIGNIFICANT CHANGE UP (ref 10–45)
ANION GAP SERPL CALC-SCNC: 9 MMOL/L — SIGNIFICANT CHANGE UP (ref 5–17)
ANISOCYTOSIS BLD QL: SIGNIFICANT CHANGE UP
APTT BLD: 29.7 SEC — SIGNIFICANT CHANGE UP (ref 27.5–35.5)
AST SERPL-CCNC: 29 U/L — SIGNIFICANT CHANGE UP (ref 10–40)
BASOPHILS # BLD AUTO: 0.02 K/UL — SIGNIFICANT CHANGE UP (ref 0–0.2)
BASOPHILS NFR BLD AUTO: 0.9 % — SIGNIFICANT CHANGE UP (ref 0–2)
BILIRUB SERPL-MCNC: 0.9 MG/DL — SIGNIFICANT CHANGE UP (ref 0.2–1.2)
BUN SERPL-MCNC: 11 MG/DL — SIGNIFICANT CHANGE UP (ref 7–23)
CALCIUM SERPL-MCNC: 8.6 MG/DL — SIGNIFICANT CHANGE UP (ref 8.4–10.5)
CHLORIDE SERPL-SCNC: 102 MMOL/L — SIGNIFICANT CHANGE UP (ref 96–108)
CO2 SERPL-SCNC: 23 MMOL/L — SIGNIFICANT CHANGE UP (ref 22–31)
CREAT SERPL-MCNC: 0.57 MG/DL — SIGNIFICANT CHANGE UP (ref 0.5–1.3)
DACRYOCYTES BLD QL SMEAR: SLIGHT — SIGNIFICANT CHANGE UP
EGFR: 96 ML/MIN/1.73M2 — SIGNIFICANT CHANGE UP
EOSINOPHIL # BLD AUTO: 0.2 K/UL — SIGNIFICANT CHANGE UP (ref 0–0.5)
EOSINOPHIL NFR BLD AUTO: 10.3 % — HIGH (ref 0–6)
GIANT PLATELETS BLD QL SMEAR: PRESENT — SIGNIFICANT CHANGE UP
GLUCOSE SERPL-MCNC: 97 MG/DL — SIGNIFICANT CHANGE UP (ref 70–99)
HCT VFR BLD CALC: 29.8 % — LOW (ref 34.5–45)
HGB BLD-MCNC: 10.2 G/DL — LOW (ref 11.5–15.5)
INR BLD: 1.35 — HIGH (ref 0.88–1.16)
LYMPHOCYTES # BLD AUTO: 0.88 K/UL — LOW (ref 1–3.3)
LYMPHOCYTES # BLD AUTO: 44.9 % — HIGH (ref 13–44)
MACROCYTES BLD QL: SIGNIFICANT CHANGE UP
MAGNESIUM SERPL-MCNC: 2.1 MG/DL — SIGNIFICANT CHANGE UP (ref 1.6–2.6)
MANUAL SMEAR VERIFICATION: SIGNIFICANT CHANGE UP
MCHC RBC-ENTMCNC: 34.2 GM/DL — SIGNIFICANT CHANGE UP (ref 32–36)
MCHC RBC-ENTMCNC: 38.6 PG — HIGH (ref 27–34)
MCV RBC AUTO: 112.9 FL — HIGH (ref 80–100)
METAMYELOCYTES # FLD: 0.9 % — HIGH (ref 0–0)
MONOCYTES # BLD AUTO: 0.46 K/UL — SIGNIFICANT CHANGE UP (ref 0–0.9)
MONOCYTES NFR BLD AUTO: 23.4 % — HIGH (ref 2–14)
NEUTROPHILS # BLD AUTO: 0.38 K/UL — LOW (ref 1.8–7.4)
NEUTROPHILS NFR BLD AUTO: 17.7 % — LOW (ref 43–77)
NEUTS BAND # BLD: 1.9 % — SIGNIFICANT CHANGE UP (ref 0–8)
OVALOCYTES BLD QL SMEAR: SLIGHT — SIGNIFICANT CHANGE UP
PHOSPHATE SERPL-MCNC: 2.7 MG/DL — SIGNIFICANT CHANGE UP (ref 2.5–4.5)
PLAT MORPH BLD: ABNORMAL
PLATELET # BLD AUTO: 33 K/UL — LOW (ref 150–400)
POIKILOCYTOSIS BLD QL AUTO: SLIGHT — SIGNIFICANT CHANGE UP
POLYCHROMASIA BLD QL SMEAR: SLIGHT — SIGNIFICANT CHANGE UP
POTASSIUM SERPL-MCNC: 4 MMOL/L — SIGNIFICANT CHANGE UP (ref 3.5–5.3)
POTASSIUM SERPL-SCNC: 4 MMOL/L — SIGNIFICANT CHANGE UP (ref 3.5–5.3)
PROT SERPL-MCNC: 6.8 G/DL — SIGNIFICANT CHANGE UP (ref 6–8.3)
PROTHROM AB SERPL-ACNC: 16.1 SEC — HIGH (ref 10.5–13.4)
RBC # BLD: 2.64 M/UL — LOW (ref 3.8–5.2)
RBC # FLD: 16.2 % — HIGH (ref 10.3–14.5)
RBC BLD AUTO: ABNORMAL
SMUDGE CELLS # BLD: PRESENT — SIGNIFICANT CHANGE UP
SODIUM SERPL-SCNC: 134 MMOL/L — LOW (ref 135–145)
WBC # BLD: 1.95 K/UL — LOW (ref 3.8–10.5)
WBC # FLD AUTO: 1.95 K/UL — LOW (ref 3.8–10.5)

## 2022-11-25 PROCEDURE — 84132 ASSAY OF SERUM POTASSIUM: CPT

## 2022-11-25 PROCEDURE — 85730 THROMBOPLASTIN TIME PARTIAL: CPT

## 2022-11-25 PROCEDURE — 83010 ASSAY OF HAPTOGLOBIN QUANT: CPT

## 2022-11-25 PROCEDURE — 82728 ASSAY OF FERRITIN: CPT

## 2022-11-25 PROCEDURE — 85610 PROTHROMBIN TIME: CPT

## 2022-11-25 PROCEDURE — 86850 RBC ANTIBODY SCREEN: CPT

## 2022-11-25 PROCEDURE — 80048 BASIC METABOLIC PNL TOTAL CA: CPT

## 2022-11-25 PROCEDURE — 86901 BLOOD TYPING SEROLOGIC RH(D): CPT

## 2022-11-25 PROCEDURE — 80053 COMPREHEN METABOLIC PANEL: CPT

## 2022-11-25 PROCEDURE — 83540 ASSAY OF IRON: CPT

## 2022-11-25 PROCEDURE — 87635 SARS-COV-2 COVID-19 AMP PRB: CPT

## 2022-11-25 PROCEDURE — 76770 US EXAM ABDO BACK WALL COMP: CPT

## 2022-11-25 PROCEDURE — 84466 ASSAY OF TRANSFERRIN: CPT

## 2022-11-25 PROCEDURE — P9100: CPT

## 2022-11-25 PROCEDURE — 99285 EMERGENCY DEPT VISIT HI MDM: CPT

## 2022-11-25 PROCEDURE — 86900 BLOOD TYPING SEROLOGIC ABO: CPT

## 2022-11-25 PROCEDURE — 70450 CT HEAD/BRAIN W/O DYE: CPT | Mod: MA

## 2022-11-25 PROCEDURE — 85025 COMPLETE CBC W/AUTO DIFF WBC: CPT

## 2022-11-25 PROCEDURE — 86803 HEPATITIS C AB TEST: CPT

## 2022-11-25 PROCEDURE — 84100 ASSAY OF PHOSPHORUS: CPT

## 2022-11-25 PROCEDURE — 81001 URINALYSIS AUTO W/SCOPE: CPT

## 2022-11-25 PROCEDURE — 36415 COLL VENOUS BLD VENIPUNCTURE: CPT

## 2022-11-25 PROCEDURE — 83615 LACTATE (LD) (LDH) ENZYME: CPT

## 2022-11-25 PROCEDURE — 36430 TRANSFUSION BLD/BLD COMPNT: CPT

## 2022-11-25 PROCEDURE — 76770 US EXAM ABDO BACK WALL COMP: CPT | Mod: 26

## 2022-11-25 PROCEDURE — 83550 IRON BINDING TEST: CPT

## 2022-11-25 PROCEDURE — 85045 AUTOMATED RETICULOCYTE COUNT: CPT

## 2022-11-25 PROCEDURE — 83735 ASSAY OF MAGNESIUM: CPT

## 2022-11-25 PROCEDURE — 82962 GLUCOSE BLOOD TEST: CPT

## 2022-11-25 PROCEDURE — P9037: CPT

## 2022-11-25 NOTE — DISCHARGE NOTE PROVIDER - CARE PROVIDER_API CALL
Gennaro Garcia  Hematology/Oncology  [address] [place], [place] [address]  Phone: [phone]  Fax: (   )    -  Established Patient  Scheduled Appointment: 12/08/2022 02:45 PM

## 2022-11-25 NOTE — DISCHARGE NOTE PROVIDER - NSDCFUADDAPPT_GEN_ALL_CORE_FT
Follows with oncologist Dr. Garcia outpatient. Please bring your Insurance card, Photo ID and Discharge paperwork to the following appointment:    (1) Please follow up with your Hematology/Oncology Provider, Dr. Gennaro Garcia at [address] [place] [address] on 12/08/2022 at 2:45pm.    Appointment was scheduled by Ms. KELLI Goldsmith, Referral Coordinator.

## 2022-11-25 NOTE — DISCHARGE NOTE NURSING/CASE MANAGEMENT/SOCIAL WORK - NSDCPEFALRISK_GEN_ALL_CORE
For information on Fall & Injury Prevention, visit: [email or web address] OR  [email or web address] OR  https://www.cdc.gov/steadi/patient.html

## 2022-11-25 NOTE — PROGRESS NOTE ADULT - PROBLEM SELECTOR PLAN 2
History of breast CA w/ possible liver mets. osteoblastic bone disease. Followed by Dr. Garcia currently not on chemotherapy regimen.     Plan:   - Per Heme/Onc:  --Hold Ibrance, AI  --Will resume mgs as outpatient
History of breast CA w/ possible liver mets. osteoblastic bone disease. Followed by Dr. Garcia currently not on chemotherapy regimen.     Plan:   - Per Heme/Onc:  --Hold Ibrance, AI  --Will resume mgs as outpatient

## 2022-11-25 NOTE — PROGRESS NOTE ADULT - ASSESSMENT
appreciate heme  to try to get bone marrow biopsy then home  if unable to can arrange outpatient
Pancytopenia to various degree over the past few months  --At this point warrants a bone marrow biopsy, will arrange for 11/25/2022  --Supportive care  --Keep platelets above 10K and 20K if bleeding (hematuria)   --Pancytopenia may be 2/2 Ibrance on hold since 11/16/2022  --My need to consider GCSF support, on intermittent support as outpatient, last dose of Leukine 11/23/2022  --S/p 1 unit of platelet transfusion on 11/23/2022 with adequate response  --Counts improving   --For BMBx today, case was discussed with IR    Metastatic breast cancer   --Hold Ibrance, AI  --Will resume mgs as outpatient     Will follow   OK for home d/c after the biopsy   Case was discussed with housestaff
WBC better  platelet better  though likely hematuria from low platelets should still do sono of kidney and bladder  Rxs per heme
pancytopenia  metastatic breast Ca  platelet transfusion warranted for hematuria  Dr Min to evaluate  IR for bone marrow biopsy
Mrs. 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 Garcia for lab work today showing pancytopenia w/ a plt count of 12 s/p platelet transfusion. 
Mrs. 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 Garcia for lab work today showing pancytopenia w/ a plt count of 12 s/p platelet transfusion.

## 2022-11-25 NOTE — DISCHARGE NOTE PROVIDER - PROVIDER TOKENS
FREE:[LAST:[Radha],FIRST:[Gennaro],PHONE:[phone]],FAX:[(   [phone]    -],ADDRESS:[Hematology/Oncology  [address] [place] [address]],SCHEDULEDAPPT:[12/08/2022],SCHEDULEDAPPTTIME:[02:45 PM],ESTABLISHEDPATIENT:[T]]

## 2022-11-25 NOTE — DISCHARGE NOTE PROVIDER - HOSPITAL COURSE
#Discharge: do not delete    Patient is __ yo M/F with past medical history of _____ presented with _____, found to have _____ (one liner)    Hospital course (by problem):     Patient was discharged to: Home    New medications: None  Changes to old medications: None  Medications that were stopped: None    Items to follow up as outpatient: Bone marrow biopsy results    Physical exam at the time of discharge:  Constitutional: resting comfortably in bed; NAD  Head: NC/AT  Eyes: PERRL, EOMI, anicteric sclera  ENT: no nasal discharge; MMM, no gum bleeding   Neck: supple; no JVD   Respiratory: CTA B/L; no W/R/R, no retractions  Cardiac: +S1/S2; RRR; no M/R/G;   Gastrointestinal: abdomen soft, NT/ND; no rebound or guarding; +BSx4  Back: spine midline, no bony tenderness or step-offs; no CVAT B/L  Extremities: WWP, no clubbing or cyanosis; no peripheral edema  Musculoskeletal: NROM x4; no joint swelling, tenderness or erythema, no significant bruising, no signs of hematoma on exam   Vascular: 2+ radial, DP pulses B/L  Dermatologic: skin warm, dry and intact; no rashes, wounds, or scars  Lymphatic: no submandibular or cervical LAD  Neurologic: AAOx3; CNII-XII grossly intact; no focal deficits  Psychiatric: affect and characteristics of appearance, verbalizations, behaviors are appropriate #Discharge: do not delete    72 year old F w/ a history of breast cancer with recent discontinuation of chemotherapy i/s/o adverse reactions noted on outpatient labwork to be pancytopenic, s/p platelet transfusion admitted for further evaluation w/ BM biopsy.    Hospital course (by problem):  Problem/Plan - 1:  ·  Problem: Pancytopenia due to chemotherapy.   ·  Plan: Patient with history of chemotherapy with ibrance which is likely the cause of her pancytopenia. Was on leukine x5 day course. Plt count on admission 12 and is s/p 1u platelet transfusion. WBC of 1.50 w/ ANC of 547 on admission, no fever noted. Exam w/o focal findings, currently no hematuria in hospital. Head CT negative.     #Thrombocytopenia   - s/p platelet transfusion - monitor for transfusion reaction  --Keep platelets above 10K and 20K if bleeding (hematuria)    - Strict bed rest - fall precautions i/s/o significant thrombocytopenia  --Pancytopenia may be 2/2 Ibrance on hold since 11/16/2022  --My need to consider GCSF support, on intermittent support as outpatient, last dose of Leukine 11/23/2022  --S/p 1 unit of platelet transfusion on 11/23/2022  --IR bone marrow biopsy 11/25    #Neutropenia - patient w/ neutropenia w/ ANC of 547 on admission. Afebrile w/ nonfocal exam  - Daily CBC w/ differential   - No rectal temperatures   - Start pseudomonal antibiotic coverage w/ zosyn if ANC drops below 500 or develop fever single oral Temp of 101F or 100.4F for >/=1hr (at the moment low risk - no symptoms, afebrile, no tachypnea, no hypotension, no signs of dehydration, Kidney and liver function normal)    #Anemia - presents with Hgb of 10.4, .7, no signs of active bleeding   - Maintain active T&S  - Tranfuse less than 7.     Problem/Plan - 2:  ·  Problem: Breast cancer.   ·  Plan: History of breast CA w/ possible liver mets. osteoblastic bone disease. Followed by Dr. Garcia currently not on chemotherapy regimen.   - Per Heme/Onc:  --Hold Ibrance, AI  --Will resume mgs as outpatient.     Patient was discharged to: Home    New medications: None  Changes to old medications: None  Medications that were stopped: None    Items to follow up as outpatient: Bone marrow biopsy results    Physical exam at the time of discharge:  Constitutional: resting comfortably in bed; NAD  Head: NC/AT  Eyes: PERRL, EOMI, anicteric sclera  ENT: no nasal discharge; MMM, no gum bleeding   Neck: supple; no JVD   Respiratory: CTA B/L; no W/R/R, no retractions  Cardiac: +S1/S2; RRR; no M/R/G;   Gastrointestinal: abdomen soft, NT/ND; no rebound or guarding; +BSx4  Back: spine midline, no bony tenderness or step-offs; no CVAT B/L  Extremities: WWP, no clubbing or cyanosis; no peripheral edema  Musculoskeletal: NROM x4; no joint swelling, tenderness or erythema, no significant bruising, no signs of hematoma on exam   Vascular: 2+ radial, DP pulses B/L  Dermatologic: skin warm, dry and intact; no rashes, wounds, or scars  Lymphatic: no submandibular or cervical LAD  Neurologic: AAOx3; CNII-XII grossly intact; no focal deficits  Psychiatric: affect and characteristics of appearance, verbalizations, behaviors are appropriate #Discharge: do not delete    72 year old F w/ a history of breast cancer with recent discontinuation of chemotherapy i/s/o adverse reactions noted on outpatient labwork to be pancytopenic, s/p platelet transfusion admitted for further evaluation w/ BM biopsy.    Hospital course (by problem):  Problem/Plan - 1:  ·  Problem: Pancytopenia due to chemotherapy.   ·  Plan: Patient with history of chemotherapy with ibrance which is likely the cause of her pancytopenia. Was on leukine x5 day course. Plt count on admission 12 and is s/p 1u platelet transfusion. WBC of 1.50 w/ ANC of 547 on admission, no fever noted. Exam w/o focal findings, currently no hematuria in hospital. Head CT negative.     #Thrombocytopenia   - s/p platelet transfusion - monitor for transfusion reaction  --Keep platelets above 10K and 20K if bleeding (hematuria)    - Strict bed rest - fall precautions i/s/o significant thrombocytopenia  --Pancytopenia may be 2/2 Ibrance on hold since 11/16/2022  --My need to consider GCSF support, on intermittent support as outpatient, last dose of Leukine 11/23/2022  --S/p 1 unit of platelet transfusion on 11/23/2022  --IR bone marrow biopsy 11/25 or outpatient w/ Dr. Garcia    #Neutropenia - patient w/ neutropenia w/ ANC of 547 on admission. Afebrile w/ nonfocal exam  - Daily CBC w/ differential   - No rectal temperatures   - Start pseudomonal antibiotic coverage w/ zosyn if ANC drops below 500 or develop fever single oral Temp of 101F or 100.4F for >/=1hr (at the moment low risk - no symptoms, afebrile, no tachypnea, no hypotension, no signs of dehydration, Kidney and liver function normal)    #Anemia - presents with Hgb of 10.4, .7, no signs of active bleeding   - Maintain active T&S  - Tranfuse less than 7.     Problem/Plan - 2:  ·  Problem: Breast cancer.   ·  Plan: History of breast CA w/ possible liver mets. osteoblastic bone disease. Followed by Dr. Garcia currently not on chemotherapy regimen.   - Per Heme/Onc:  --Hold Ibrance, AI  --Will resume mgs as outpatient.     Patient was discharged to: Home    New medications: None  Changes to old medications: None  Medications that were stopped: None    Items to follow up as outpatient: Bone marrow biopsy    Physical exam at the time of discharge:  Constitutional: resting comfortably in bed; NAD  Head: NC/AT  Eyes: PERRL, EOMI, anicteric sclera  ENT: no nasal discharge; MMM, no gum bleeding   Neck: supple; no JVD   Respiratory: CTA B/L; no W/R/R, no retractions  Cardiac: +S1/S2; RRR; no M/R/G;   Gastrointestinal: abdomen soft, NT/ND; no rebound or guarding; +BSx4  Back: spine midline, no bony tenderness or step-offs; no CVAT B/L  Extremities: WWP, no clubbing or cyanosis; no peripheral edema  Musculoskeletal: NROM x4; no joint swelling, tenderness or erythema, no significant bruising, no signs of hematoma on exam   Vascular: 2+ radial, DP pulses B/L  Dermatologic: skin warm, dry and intact; no rashes, wounds, or scars  Lymphatic: no submandibular or cervical LAD  Neurologic: AAOx3; CNII-XII grossly intact; no focal deficits  Psychiatric: affect and characteristics of appearance, verbalizations, behaviors are appropriate

## 2022-11-25 NOTE — DISCHARGE NOTE NURSING/CASE MANAGEMENT/SOCIAL WORK - PATIENT PORTAL LINK FT
You can access the FollowMyHealth Patient Portal offered by [facility] by registering at the following website: [email or web address]. By joining [email or web address]’s FollowMyHealth portal, you will also be able to view your health information using other applications (apps) compatible with our system.

## 2022-11-25 NOTE — PROGRESS NOTE ADULT - PROBLEM SELECTOR PLAN 1
Patient with history of chemotherapy with ibrance which is likely the cause of her pancytopenia. Was on leukine x5 day course. Plt count on admission 12 and is s/p 1u platelet transfusion. WBC of 1.50 w/ ANC of 547 on admission, no fever noted. Exam w/o focal findings, currently no hematuria in hospital. Head CT negative.     #Thrombocytopenia   - s/p platelet transfusion - monitor for transfusion reaction  --Keep platelets above 10K and 20K if bleeding (hematuria)    - Strict bed rest - fall precautions i/s/o significant thrombocytopenia  --Pancytopenia may be 2/2 Ibrance on hold since 11/16/2022  --My need to consider GCSF support, on intermittent support as outpatient, last dose of Leukine 11/23/2022  --S/p 1 unit of platelet transfusion on 11/23/2022  --IR bone marrow biopsy possibly 11/25 or outpatient w Dr. Garcia    #Neutropenia - patient w/ neutropenia w/ ANC of 547 on admission. Afebrile w/ nonfocal exam  - Daily CBC w/ differential   - No rectal temperatures   - Start pseudomonal antibiotic coverage w/ zosyn if ANC drops below 500 or develop fever single oral Temp of 101F or 100.4F for >/=1hr (at the moment low risk - no symptoms, afebrile, no tachypnea, no hypotension, no signs of dehydration, Kidney and liver function normal)    #Anemia - presents with Hgb of 10.4, .7, no signs of active bleeding   - Maintain active T&S  - Tranfuse less than 7 Patient with history of chemotherapy with ibrance which is likely the cause of her pancytopenia. Was on leukine x5 day course. Plt count on admission 12 and is s/p 1u platelet transfusion. WBC of 1.50 w/ ANC of 547 on admission, no fever noted. Exam w/o focal findings, currently no hematuria in hospital. Head CT negative.     #Thrombocytopenia   - s/p platelet transfusion - monitor for transfusion reaction  --Keep platelets above 10K and 20K if bleeding (hematuria)    - Strict bed rest - fall precautions i/s/o significant thrombocytopenia  --Pancytopenia may be 2/2 Ibrance on hold since 11/16/2022  --My need to consider GCSF support, on intermittent support as outpatient, last dose of Leukine 11/23/2022  --S/p 1 unit of platelet transfusion on 11/23/2022  --IR bone marrow biopsy 11/25    #Neutropenia - patient w/ neutropenia w/ ANC of 547 on admission. Afebrile w/ nonfocal exam  - Daily CBC w/ differential   - No rectal temperatures   - Start pseudomonal antibiotic coverage w/ zosyn if ANC drops below 500 or develop fever single oral Temp of 101F or 100.4F for >/=1hr (at the moment low risk - no symptoms, afebrile, no tachypnea, no hypotension, no signs of dehydration, Kidney and liver function normal)    #Anemia - presents with Hgb of 10.4, .7, no signs of active bleeding   - Maintain active T&S  - Tranfuse less than 7

## 2022-11-25 NOTE — DISCHARGE NOTE NURSING/CASE MANAGEMENT/SOCIAL WORK - NSDCFUADDAPPT_GEN_ALL_CORE_FT
Please bring your Insurance card, Photo ID and Discharge paperwork to the following appointment:    (1) Please follow up with your Hematology/Oncology Provider, Dr. Gennaro Garcia at [address] [place] [address] on 12/08/2022 at 2:45pm.    Appointment was scheduled by Ms. KELLI Goldsmith, Referral Coordinator.

## 2022-11-25 NOTE — DISCHARGE NOTE PROVIDER - NSDCCPCAREPLAN_GEN_ALL_CORE_FT
PRINCIPAL DISCHARGE DIAGNOSIS  Diagnosis: Pancytopenia  Assessment and Plan of Treatment: Pancytopenia is a condition in which a person has an abnormally low number (deficiency) of the following blood cells: Red blood cells (RBCs). Having too few RBCs is called anemia. White blood cells (WBCs). Having too few WBCs is called leukopenia. Cells that help the blood clot (platelets). Having too few platelets is called thrombocytopenia. There are many possible causes of this condition. Certain medicines often cause this condition, including chemotherapy and other cancer therapies. This condition may be diagnosed based on: Your symptoms. Your medical history. A physical exam. Tests. These may include: Removal of a sample of bone marrow to be looked at under a microscope (biopsy). This is done by putting a needle into a bone to remove fluid and cells (aspiration). A complete blood count (CBC). This is a group of tests that measures characteristics of WBCs, RBCs, and platelets. A peripheral blood smear. This test examines your blood under a microscope to provide information about medicines and diseases that affect RBCs, WBCs, and platelets. Reticulocyte count. This is a test that measures the number of new or immature RBCs (reticulocytes) that are made by your bone marrow.  Ways you can protect yourself: Do not take part in contact sports or dangerous activities. Ask your health care provider what activities are safe for you. During cold and flu season, avoid crowded places and avoid contact with people who are sick. Do not use any products that contain nicotine or tobacco. Get help right away if: You have bleeding that does not stop. You are wheezing. This means making high-pitched whistling sounds when you breathe, most often when you breathe out. You have shortness of breath. You have chest pain.

## 2022-11-25 NOTE — PROGRESS NOTE ADULT - SUBJECTIVE AND OBJECTIVE BOX
Patient is a 72y old  Female  History of breast Ca (2021)  with mets to liver on chemo (last one 2 weeks ago) h/o meningioma with prior surgery who had been having neutropenia and thrombocytopenia despite getting leukine complains of hematuria and bruising and hence advised to be admitted for platelet transfusion and possible bone marrow biopsy.       Patient reports last Thursday her wbc and plt were low, started on Leukine x 5 doses (Thurs/Fri and Mon-today).  had rpt labs today and noted wbc dropped to 1.8 and plt to 11.  sent in for plt transfusion, Ibrance was dc one week ago.  pt reports episode of hematuria this morning and chronic easy bruising but denies bleeding gums, melena/hematochezia, f/c, dizziness/HA, chest pain, sob, abd pain, nvd, dysuria/urinary frequency/urgency, numbness/weakness, fall/trauma          REVIEW OF SYSTEMS:  Constitutional: No fever, +/- weight loss or fatigue  ENMT:  No difficulty hearing, tinnitus, vertigo; No sinus or throat pain  Respiratory: No cough, wheezing, chills or hemoptysis  Cardiovascular: No chest pain, palpitations, dizziness or leg swelling  Gastrointestinal: No abdominal or epigastric pain. No nausea, vomiting or hematemesis; No diarrhea or constipation. No melena or hematochezia.  : hematuria  Skin: No itching, burning,  + bruising  Musculoskeletal: No joint pain or swelling; No muscle, back or extremity pain    PAST MEDICAL & SURGICAL HISTORY:  Breast cancer      Meningioma          FAMILY HISTORY:      SOCIAL HISTORY:  Smoking Status: [ ] Current, [ ] Former, [ x] Never  Pack Years:    MEDICATIONS:  MEDICATIONS  (STANDING):    MEDICATIONS  (PRN):      Allergies    No Known Allergies    Intolerances        Vital Signs Last 24 Hrs  T(C): 37.1 (23 Nov 2022 16:12), Max: 37.2 (23 Nov 2022 14:17)  T(F): 98.7 (23 Nov 2022 16:12), Max: 99 (23 Nov 2022 14:17)  HR: 94 (23 Nov 2022 16:12) (91 - 103)  BP: 126/75 (23 Nov 2022 16:12) (123/75 - 155/88)  BP(mean): --  RR: 16 (23 Nov 2022 16:12) (16 - 18)  SpO2: 97% (23 Nov 2022 16:12) (96% - 98%)    Parameters below as of 23 Nov 2022 16:12  Patient On (Oxygen Delivery Method): room air            PHYSICAL EXAM:    General: thin pleasant; in no acute distress  HEENT: MMM, conjunctiva and sclera clear  Lungs: clear  heart: regular  Gastrointestinal: Soft, non-tender non-distended; Normal bowel sounds; No rebound or guarding  Extremities: Normal range of motion, No clubbing, cyanosis or edema  Neurological: Alert and oriented x3  Skin: Warm and dry. No obvious rash      LABS:                        10.4   1.50  )-----------( 12       ( 23 Nov 2022 13:19 )             29.5     11-23    x   |  x   |  x   ----------------------------<  x   3.4<L>   |  x   |  x     Ca    8.7      23 Nov 2022 13:19    TPro  6.5  /  Alb  3.1<L>  /  TBili  0.8  /  DBili  x   /  AST  See Note  /  ALT  See Note  /  AlkPhos  140<H>  11-23          RADIOLOGY & ADDITIONAL STUDIES:   
Patient is a 72y old  Female who presents with a chief complaint of Pancytopenia (23 Nov 2022 17:58)        HPI:  Mrs. 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 Garcia for lab work today showing pancytopenia w/ a plt count of 12. Patient is currently denying any complaints at the time, other than hematuria this AM currently denying any signs of bleeding. Patient was being treated w/ ibrance for breast cancer which was discontinued she states approx 2 weeks ago 2/2 patient tolerance. On 11/21 patient was found to be neutropenic and started on Leukine x5 doses. She went to have blood work drawn today and found to have a plt count of 11. Patient otherwise feels at her USOH other than mild peripheral bruising which she states is not new. She otherwise denies bleeding gums, melena, hematochezia, fever, chills, dizziness, HA, chest pain, SOB, n/v/d, dysuria or recent fall/trauma.     ED: VSS - 98.7F oral temp, 94 HR, 126/75, 97% RA.   Labs s/f: WBC 1.50, Neutrophil %31.3, Bands 5.2 - , Hgb 10.4, Plt 12, Na 135, K 3.4, BUN/Cr 12/0.55, Albumin 3.1, Alk phos 140, AST/ALT clotted  UA negative  COVID negative   CTH - no bleed   Interventions: 1u Plt   (23 Nov 2022 17:58)     11/25/2022: feeling well, count improving.   For BMBx with IR today.       PAST MEDICAL & SURGICAL HISTORY:  Breast cancer      Meningioma      S/P resection of meningioma            FAMILY HISTORY:      Social History:    Allergies    No Known Allergies    Intolerances        MEDICATIONS  (STANDING):  influenza  Vaccine (HIGH DOSE) 0.7 milliLiter(s) IntraMuscular once    MEDICATIONS  (PRN):  acetaminophen     Tablet .. 650 milliGRAM(s) Oral every 6 hours PRN Temp greater or equal to 38C (100.4F), Mild Pain (1 - 3)  aluminum hydroxide/magnesium hydroxide/simethicone Suspension 30 milliLiter(s) Oral every 4 hours PRN Dyspepsia  melatonin 3 milliGRAM(s) Oral at bedtime PRN Insomnia  ondansetron Injectable 4 milliGRAM(s) IV Push every 8 hours PRN Nausea and/or Vomiting      Vital Signs Last 24 Hrs  T(C): 36.8 (23 Nov 2022 20:45), Max: 37.2 (23 Nov 2022 14:17)  T(F): 98.2 (23 Nov 2022 20:45), Max: 99 (23 Nov 2022 14:17)  HR: 90 (23 Nov 2022 20:45) (90 - 103)  BP: 150/79 (23 Nov 2022 20:45) (120/71 - 155/88)  BP(mean): --  RR: 17 (23 Nov 2022 20:45) (16 - 18)  SpO2: 96% (23 Nov 2022 20:45) (96% - 98%)    Parameters below as of 23 Nov 2022 20:45  Patient On (Oxygen Delivery Method): room air        ROS:  Negative except for:    PHYSICAL EXAM  General: adult in NAD  HEENT: clear oropharynx, anicteric sclera, pink conjunctiva  Neck: supple  CV: normal S1/S2 with no murmur rubs or gallops  Lungs: positive air movement b/l ant lungs,clear to auscultation, no wheezes, no rales  Abdomen: soft non-tender non-distended, no hepatosplenomegaly  Ext: no clubbing cyanosis or edema  Skin: no rashes and no petechiae  Neuro: alert and oriented X 4, no focal deficits      LABS:                          10.2   1.95  )-----------( 33       ( 25 Nov 2022 05:30 )             29.8       11-25    134<L>  |  102  |  11  ----------------------------<  97  4.0   |  23  |  0.57    Ca    8.6      25 Nov 2022 05:30  Phos  2.7     11-25  Mg     2.1     11-25    TPro  6.8  /  Alb  3.4  /  TBili  0.9  /  DBili  x   /  AST  29  /  ALT  22  /  AlkPhos  146<H>  11-25              LIVER FUNCTIONS - ( 23 Nov 2022 11:50 )  Alb: 3.1 g/dL / Pro: 6.5 g/dL / ALK PHOS: 140 U/L / ALT: See Note / AST: See Note / GGT: x           BLOOD SMEAR INTERPRETATION:       RADIOLOGY & ADDITIONAL STUDIES:    Assessment And Plan:    
Pt seen and examined   no complaints  hematuria better    REVIEW OF SYSTEMS:  Constitutional: No fever, weight loss or fatigue  Cardiovascular: No chest pain, palpitations, dizziness or leg swelling  Gastrointestinal: No abdominal or epigastric pain. No nausea, vomiting; No diarrhea or constipation. No melena or hematochezia.   no more hematuria  Skin: No itching, burning, rashes or lesions       MEDICATIONS:  MEDICATIONS  (STANDING):  influenza  Vaccine (HIGH DOSE) 0.7 milliLiter(s) IntraMuscular once    MEDICATIONS  (PRN):  acetaminophen     Tablet .. 650 milliGRAM(s) Oral every 6 hours PRN Temp greater or equal to 38C (100.4F), Mild Pain (1 - 3)  aluminum hydroxide/magnesium hydroxide/simethicone Suspension 30 milliLiter(s) Oral every 4 hours PRN Dyspepsia  melatonin 3 milliGRAM(s) Oral at bedtime PRN Insomnia  ondansetron Injectable 4 milliGRAM(s) IV Push every 8 hours PRN Nausea and/or Vomiting      Allergies    No Known Allergies    Intolerances        Vital Signs Last 24 Hrs  T(C): 36.9 (2022 09:05), Max: 37.2 (2022 14:17)  T(F): 98.4 (2022 09:05), Max: 99 (2022 14:17)  HR: 93 (2022 09:05) (90 - 101)  BP: 110/70 (2022 09:05) (110/70 - 150/79)  BP(mean): --  RR: 18 (2022 09:05) (16 - 18)  SpO2: 98% (2022 09:05) (96% - 98%)    Parameters below as of 2022 09:05  Patient On (Oxygen Delivery Method): room air          PHYSICAL EXAM:    General: ; in no acute distress  HEENT: MMM, conjunctiva and sclera clear  Lungs: clear  Heart: regular  Gastrointestinal: Soft non-tender non-distended; Normal bowel sounds;  Skin: Warm and dry. No obvious rash    LABS:      CBC Full  -  ( 2022 07:47 )  WBC Count : 1.88 K/uL  RBC Count : 2.54 M/uL  Hemoglobin : 9.8 g/dL  Hematocrit : 29.3 %  Platelet Count - Automated : 32 K/uL  Mean Cell Volume : 115.4 fl  Mean Cell Hemoglobin : 38.6 pg  Mean Cell Hemoglobin Concentration : 33.4 gm/dL  Auto Neutrophil # : 0.96 K/uL  Auto Lymphocyte # : 0.61 K/uL  Auto Monocyte # : 0.22 K/uL  Auto Eosinophil # : 0.08 K/uL  Auto Basophil # : 0.00 K/uL  Auto Neutrophil % : 45.5 %  Auto Lymphocyte % : 32.7 %  Auto Monocyte % : 11.8 %  Auto Eosinophil % : 4.5 %  Auto Basophil % : 0.0 %        137  |  107  |  9   ----------------------------<  92  4.1   |  24  |  0.54    Ca    8.8      2022 07:47    TPro  6.6  /  Alb  3.3  /  TBili  1.3<H>  /  DBili  x   /  AST  26  /  ALT  20  /  AlkPhos  135<H>      PT/INR - ( 2022 07:48 )   PT: 17.4 sec;   INR: 1.46          PTT - ( 2022 07:48 )  PTT:29.5 sec      Urinalysis Basic - ( 2022 13:28 )    Color: Yellow / Appearance: Clear / S.015 / pH: x  Gluc: x / Ketone: NEGATIVE  / Bili: Negative / Urobili: 0.2 E.U./dL   Blood: x / Protein: NEGATIVE mg/dL / Nitrite: NEGATIVE   Leuk Esterase: NEGATIVE / RBC: > 10 /HPF / WBC < 5 /HPF   Sq Epi: x / Non Sq Epi: 0-5 /HPF / Bacteria: Present /HPF                RADIOLOGY & ADDITIONAL STUDIES (The following images were personally reviewed):
OVERNIGHT EVENTS:  None.    SUBJECTIVE / INTERVAL HPI: Patient seen and examined at bedside.  Endorses no pain or discomfort; was able to sleep on/off, friendly.    VITAL SIGNS:  Vital Signs Last 24 Hrs  T(C): 37.1 (2022 06:15), Max: 37.2 (2022 14:17)  T(F): 98.8 (2022 06:15), Max: 99 (2022 14:17)  HR: 92 (2022 06:15) (90 - 103)  BP: 149/72 (2022 06:15) (120/71 - 155/88)  BP(mean): --  RR: 18 (2022 06:15) (16 - 18)  SpO2: 96% (2022 06:15) (96% - 98%)    Parameters below as of 2022 20:45  Patient On (Oxygen Delivery Method): room air      I&O's Summary      PHYSICAL EXAM:  Constitutional: resting comfortably in bed; NAD  Head: NC/AT  Eyes: PERRL, EOMI, anicteric sclera  ENT: no nasal discharge; MMM, no gum bleeding   Neck: supple; no JVD   Respiratory: CTA B/L; no W/R/R, no retractions  Cardiac: +S1/S2; RRR; no M/R/G;   Gastrointestinal: abdomen soft, NT/ND; no rebound or guarding; +BSx4  Back: spine midline, no bony tenderness or step-offs; no CVAT B/L  Extremities: WWP, no clubbing or cyanosis; no peripheral edema  Musculoskeletal: NROM x4; no joint swelling, tenderness or erythema, no significant bruising, no signs of hematoma on exam   Vascular: 2+ radial, DP pulses B/L  Dermatologic: skin warm, dry and intact; no rashes, wounds, or scars  Lymphatic: no submandibular or cervical LAD  Neurologic: AAOx3; CNII-XII grossly intact; no focal deficits  Psychiatric: affect and characteristics of appearance, verbalizations, behaviors are appropriate    MEDICATIONS:  MEDICATIONS  (STANDING):  influenza  Vaccine (HIGH DOSE) 0.7 milliLiter(s) IntraMuscular once    MEDICATIONS  (PRN):  acetaminophen     Tablet .. 650 milliGRAM(s) Oral every 6 hours PRN Temp greater or equal to 38C (100.4F), Mild Pain (1 - 3)  aluminum hydroxide/magnesium hydroxide/simethicone Suspension 30 milliLiter(s) Oral every 4 hours PRN Dyspepsia  melatonin 3 milliGRAM(s) Oral at bedtime PRN Insomnia  ondansetron Injectable 4 milliGRAM(s) IV Push every 8 hours PRN Nausea and/or Vomiting      ALLERGIES:  Allergies    No Known Allergies    Intolerances        LABS:                        10.4   1.50  )-----------( 12       ( 2022 13:19 )             29.5         x   |  x   |  x   ----------------------------<  x   3.4<L>   |  x   |  x     Ca    8.7      2022 13:19    TPro  6.5  /  Alb  3.1<L>  /  TBili  0.8  /  DBili  x   /  AST  See Note  /  ALT  See Note  /  AlkPhos  140<H>      PT/INR - ( 2022 13:19 )   PT: 18.0 sec;   INR: 1.51          PTT - ( 2022 13:19 )  PTT:28.5 sec  Urinalysis Basic - ( 2022 13:28 )    Color: Yellow / Appearance: Clear / S.015 / pH: x  Gluc: x / Ketone: NEGATIVE  / Bili: Negative / Urobili: 0.2 E.U./dL   Blood: x / Protein: NEGATIVE mg/dL / Nitrite: NEGATIVE   Leuk Esterase: NEGATIVE / RBC: > 10 /HPF / WBC < 5 /HPF   Sq Epi: x / Non Sq Epi: 0-5 /HPF / Bacteria: Present /HPF      CAPILLARY BLOOD GLUCOSE          RADIOLOGY & ADDITIONAL TESTS: Reviewed.  
Pt seen and examined   no complaints  wbc sl improvement  Platelet = stable 30s    REVIEW OF SYSTEMS:  Constitutional: No fever, weight loss or fatigue  Cardiovascular: No chest pain, palpitations, dizziness or leg swelling  Gastrointestinal: No abdominal or epigastric pain. No nausea, vomiting or hematemesis; No diarrhea or constipation. No melena or hematochezia.  Skin: No itching, burning, rashes or lesions    no hematuria      MEDICATIONS:  MEDICATIONS  (STANDING):  influenza  Vaccine (HIGH DOSE) 0.7 milliLiter(s) IntraMuscular once    MEDICATIONS  (PRN):  acetaminophen     Tablet .. 650 milliGRAM(s) Oral every 6 hours PRN Temp greater or equal to 38C (100.4F), Mild Pain (1 - 3)  aluminum hydroxide/magnesium hydroxide/simethicone Suspension 30 milliLiter(s) Oral every 4 hours PRN Dyspepsia  melatonin 3 milliGRAM(s) Oral at bedtime PRN Insomnia  ondansetron Injectable 4 milliGRAM(s) IV Push every 8 hours PRN Nausea and/or Vomiting      Allergies    No Known Allergies    Intolerances        Vital Signs Last 24 Hrs  T(C): 36.4 (2022 08:50), Max: 37.1 (2022 15:05)  T(F): 97.6 (2022 08:50), Max: 98.7 (2022 15:05)  HR: 93 (2022 08:50) (79 - 93)  BP: 133/79 (2022 08:50) (116/70 - 148/81)  BP(mean): --  RR: 18 (2022 08:50) (17 - 18)  SpO2: 97% (2022 08:50) (96% - 98%)    Parameters below as of 2022 08:50  Patient On (Oxygen Delivery Method): room air          PHYSICAL EXAM:    General:  in no acute distress  HEENT: MMM, conjunctiva and sclera clear  Lungs: clear  Heart: regular  Gastrointestinal: Soft non-tender non-distended; Normal bowel sounds;   Skin: Warm and dry. No obvious rash    LABS:      CBC Full  -  ( 2022 05:30 )  WBC Count : 1.95 K/uL  RBC Count : 2.64 M/uL  Hemoglobin : 10.2 g/dL  Hematocrit : 29.8 %  Platelet Count - Automated : 33 K/uL  Mean Cell Volume : 112.9 fl  Mean Cell Hemoglobin : 38.6 pg  Mean Cell Hemoglobin Concentration : 34.2 gm/dL  Auto Neutrophil # : x  Auto Lymphocyte # : x  Auto Monocyte # : x  Auto Eosinophil # : x  Auto Basophil # : x  Auto Neutrophil % : x  Auto Lymphocyte % : x  Auto Monocyte % : x  Auto Eosinophil % : x  Auto Basophil % : x        134<L>  |  102  |  11  ----------------------------<  97  4.0   |  23  |  0.57    Ca    8.6      2022 05:30  Phos  2.7       Mg     2.1         TPro  6.8  /  Alb  3.4  /  TBili  0.9  /  DBili  x   /  AST  29  /  ALT  22  /  AlkPhos  146<H>      PT/INR - ( 2022 05:30 )   PT: 16.1 sec;   INR: 1.35          PTT - ( 2022 05:30 )  PTT:29.7 sec      Urinalysis Basic - ( 2022 13:28 )    Color: Yellow / Appearance: Clear / S.015 / pH: x  Gluc: x / Ketone: NEGATIVE  / Bili: Negative / Urobili: 0.2 E.U./dL   Blood: x / Protein: NEGATIVE mg/dL / Nitrite: NEGATIVE   Leuk Esterase: NEGATIVE / RBC: > 10 /HPF / WBC < 5 /HPF   Sq Epi: x / Non Sq Epi: 0-5 /HPF / Bacteria: Present /HPF                RADIOLOGY & ADDITIONAL STUDIES (The following images were personally reviewed):
OVERNIGHT EVENTS:  None.    SUBJECTIVE / INTERVAL HPI: Patient seen and examined at bedside.  Pleasant, in no pain or discomfort.    VITAL SIGNS:  Vital Signs Last 24 Hrs  T(C): 36.4 (2022 08:50), Max: 37.1 (2022 15:05)  T(F): 97.6 (2022 08:50), Max: 98.7 (2022 15:05)  HR: 93 (2022 08:50) (79 - 93)  BP: 133/79 (2022 08:50) (110/70 - 148/81)  BP(mean): --  RR: 18 (2022 08:50) (17 - 18)  SpO2: 97% (2022 08:50) (96% - 98%)    Parameters below as of 2022 08:50  Patient On (Oxygen Delivery Method): room air      I&O's Summary      PHYSICAL EXAM:  Constitutional: resting comfortably in bed; NAD  Head: NC/AT  Eyes: PERRL, EOMI, anicteric sclera  ENT: no nasal discharge; MMM, no gum bleeding   Neck: supple; no JVD   Respiratory: CTA B/L; no W/R/R, no retractions  Cardiac: +S1/S2; RRR; no M/R/G;   Gastrointestinal: abdomen soft, NT/ND; no rebound or guarding; +BSx4  Back: spine midline, no bony tenderness or step-offs; no CVAT B/L  Extremities: WWP, no clubbing or cyanosis; no peripheral edema  Musculoskeletal: NROM x4; no joint swelling, tenderness or erythema, no significant bruising, no signs of hematoma on exam   Vascular: 2+ radial, DP pulses B/L  Dermatologic: skin warm, dry and intact; no rashes, wounds, or scars  Lymphatic: no submandibular or cervical LAD  Neurologic: AAOx3; CNII-XII grossly intact; no focal deficits  Psychiatric: affect and characteristics of appearance, verbalizations, behaviors are appropriate      MEDICATIONS:  MEDICATIONS  (STANDING):  influenza  Vaccine (HIGH DOSE) 0.7 milliLiter(s) IntraMuscular once    MEDICATIONS  (PRN):  acetaminophen     Tablet .. 650 milliGRAM(s) Oral every 6 hours PRN Temp greater or equal to 38C (100.4F), Mild Pain (1 - 3)  aluminum hydroxide/magnesium hydroxide/simethicone Suspension 30 milliLiter(s) Oral every 4 hours PRN Dyspepsia  melatonin 3 milliGRAM(s) Oral at bedtime PRN Insomnia  ondansetron Injectable 4 milliGRAM(s) IV Push every 8 hours PRN Nausea and/or Vomiting      ALLERGIES:  Allergies    No Known Allergies    Intolerances        LABS:                        10.2   1.95  )-----------(        ( 2022 05:30 )             29.8         134<L>  |  102  |  11  ----------------------------<  97  4.0   |  23  |  0.57    Ca    8.6      2022 05:30  Phos  2.7       Mg     2.1         TPro  6.8  /  Alb  3.4  /  TBili  0.9  /  DBili  x   /  AST  29  /  ALT  22  /  AlkPhos  146<H>      PT/INR - ( 2022 05:30 )   PT: 16.1 sec;   INR: 1.35          PTT - ( 2022 05:30 )  PTT:29.7 sec  Urinalysis Basic - ( 2022 13:28 )    Color: Yellow / Appearance: Clear / S.015 / pH: x  Gluc: x / Ketone: NEGATIVE  / Bili: Negative / Urobili: 0.2 E.U./dL   Blood: x / Protein: NEGATIVE mg/dL / Nitrite: NEGATIVE   Leuk Esterase: NEGATIVE / RBC: > 10 /HPF / WBC < 5 /HPF   Sq Epi: x / Non Sq Epi: 0-5 /HPF / Bacteria: Present /HPF      CAPILLARY BLOOD GLUCOSE      POCT Blood Glucose.: 224 mg/dL (2022 12:01)      RADIOLOGY & ADDITIONAL TESTS: Reviewed.

## 2022-11-30 DIAGNOSIS — C78.7 SECONDARY MALIGNANT NEOPLASM OF LIVER AND INTRAHEPATIC BILE DUCT: ICD-10-CM

## 2022-11-30 DIAGNOSIS — D61.810 ANTINEOPLASTIC CHEMOTHERAPY INDUCED PANCYTOPENIA: ICD-10-CM

## 2022-11-30 DIAGNOSIS — R31.9 HEMATURIA, UNSPECIFIED: ICD-10-CM

## 2022-11-30 DIAGNOSIS — C50.919 MALIGNANT NEOPLASM OF UNSPECIFIED SITE OF UNSPECIFIED FEMALE BREAST: ICD-10-CM

## 2022-11-30 DIAGNOSIS — Z20.822 CONTACT WITH AND (SUSPECTED) EXPOSURE TO COVID-19: ICD-10-CM

## 2022-11-30 DIAGNOSIS — Z86.011 PERSONAL HISTORY OF BENIGN NEOPLASM OF THE BRAIN: ICD-10-CM
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
## 2022-11-30 DIAGNOSIS — T45.1X5A ADVERSE EFFECT OF ANTINEOPLASTIC AND IMMUNOSUPPRESSIVE DRUGS, INITIAL ENCOUNTER: ICD-10-CM

## 2022-11-30 DIAGNOSIS — M89.8X9 OTHER SPECIFIED DISORDERS OF BONE, UNSPECIFIED SITE: ICD-10-CM

## 2022-12-20 PROBLEM — C50.919 MALIGNANT NEOPLASM OF UNSPECIFIED SITE OF UNSPECIFIED FEMALE BREAST: Chronic | Status: ACTIVE | Noted: 2022-11-23

## 2022-12-20 PROBLEM — D32.9 BENIGN NEOPLASM OF MENINGES, UNSPECIFIED: Chronic | Status: ACTIVE | Noted: 2022-11-23

## 2022-12-21 ENCOUNTER — OUTPATIENT (OUTPATIENT)
Dept: OUTPATIENT SERVICES | Facility: HOSPITAL | Age: 72
LOS: 1 days | End: 2022-12-21
Payer: MEDICARE

## 2022-12-21 ENCOUNTER — APPOINTMENT (OUTPATIENT)
Dept: CT IMAGING | Facility: HOSPITAL | Age: 72
End: 2022-12-21

## 2022-12-21 DIAGNOSIS — Z98.890 OTHER SPECIFIED POSTPROCEDURAL STATES: Chronic | ICD-10-CM

## 2022-12-21 PROCEDURE — 82565 ASSAY OF CREATININE: CPT

## 2022-12-21 PROCEDURE — 71260 CT THORAX DX C+: CPT | Mod: 26,MD

## 2022-12-21 PROCEDURE — 74177 CT ABD & PELVIS W/CONTRAST: CPT | Mod: 26,MD

## 2022-12-21 PROCEDURE — 74177 CT ABD & PELVIS W/CONTRAST: CPT | Mod: MD

## 2022-12-21 PROCEDURE — 71260 CT THORAX DX C+: CPT | Mod: MD

## 2022-12-29 ENCOUNTER — OUTPATIENT (OUTPATIENT)
Dept: OUTPATIENT SERVICES | Facility: HOSPITAL | Age: 72
LOS: 1 days | End: 2022-12-29
Payer: MEDICARE

## 2022-12-29 DIAGNOSIS — Z85.3 PERSONAL HISTORY OF MALIGNANT NEOPLASM OF BREAST: ICD-10-CM

## 2022-12-29 DIAGNOSIS — I31.9 DISEASE OF PERICARDIUM, UNSPECIFIED: ICD-10-CM

## 2022-12-29 DIAGNOSIS — Z98.890 OTHER SPECIFIED POSTPROCEDURAL STATES: Chronic | ICD-10-CM

## 2022-12-29 PROCEDURE — 93306 TTE W/DOPPLER COMPLETE: CPT | Mod: 26

## 2022-12-29 PROCEDURE — 93306 TTE W/DOPPLER COMPLETE: CPT

## 2023-01-16 ENCOUNTER — EMERGENCY (EMERGENCY)
Facility: HOSPITAL | Age: 73
LOS: 1 days | Discharge: HOME | End: 2023-01-16
Attending: STUDENT IN AN ORGANIZED HEALTH CARE EDUCATION/TRAINING PROGRAM | Admitting: STUDENT IN AN ORGANIZED HEALTH CARE EDUCATION/TRAINING PROGRAM
Payer: MEDICARE

## 2023-01-16 VITALS
HEART RATE: 102 BPM | WEIGHT: 125 LBS | OXYGEN SATURATION: 96 % | TEMPERATURE: 97 F | SYSTOLIC BLOOD PRESSURE: 135 MMHG | HEIGHT: 59 IN | DIASTOLIC BLOOD PRESSURE: 84 MMHG | RESPIRATION RATE: 18 BRPM

## 2023-01-16 VITALS
SYSTOLIC BLOOD PRESSURE: 120 MMHG | OXYGEN SATURATION: 97 % | HEART RATE: 98 BPM | DIASTOLIC BLOOD PRESSURE: 67 MMHG | TEMPERATURE: 98 F | RESPIRATION RATE: 16 BRPM

## 2023-01-16 DIAGNOSIS — Z86.2 PERSONAL HISTORY OF DISEASES OF THE BLOOD AND BLOOD-FORMING ORGANS AND CERTAIN DISORDERS INVOLVING THE IMMUNE MECHANISM: ICD-10-CM

## 2023-01-16 DIAGNOSIS — R60.0 LOCALIZED EDEMA: ICD-10-CM

## 2023-01-16 DIAGNOSIS — Z85.05 PERSONAL HISTORY OF MALIGNANT NEOPLASM OF LIVER: ICD-10-CM

## 2023-01-16 DIAGNOSIS — Z98.890 OTHER SPECIFIED POSTPROCEDURAL STATES: Chronic | ICD-10-CM

## 2023-01-16 DIAGNOSIS — R53.1 WEAKNESS: ICD-10-CM

## 2023-01-16 DIAGNOSIS — Z85.3 PERSONAL HISTORY OF MALIGNANT NEOPLASM OF BREAST: ICD-10-CM

## 2023-01-16 DIAGNOSIS — R31.9 HEMATURIA, UNSPECIFIED: ICD-10-CM

## 2023-01-16 DIAGNOSIS — Z20.822 CONTACT WITH AND (SUSPECTED) EXPOSURE TO COVID-19: ICD-10-CM

## 2023-01-16 LAB
ALBUMIN SERPL ELPH-MCNC: 3.3 G/DL — SIGNIFICANT CHANGE UP (ref 3.3–5)
ALP SERPL-CCNC: 231 U/L — HIGH (ref 40–120)
ALT FLD-CCNC: SIGNIFICANT CHANGE UP U/L (ref 10–45)
ANION GAP SERPL CALC-SCNC: 10 MMOL/L — SIGNIFICANT CHANGE UP (ref 5–17)
ANION GAP SERPL CALC-SCNC: 7 MMOL/L — SIGNIFICANT CHANGE UP (ref 5–17)
ANISOCYTOSIS BLD QL: SIGNIFICANT CHANGE UP
APPEARANCE UR: ABNORMAL
APTT BLD: 25.8 SEC — LOW (ref 27.5–35.5)
AST SERPL-CCNC: SIGNIFICANT CHANGE UP U/L (ref 10–40)
BACTERIA # UR AUTO: PRESENT /HPF
BASE EXCESS BLDV CALC-SCNC: 0.5 MMOL/L — SIGNIFICANT CHANGE UP (ref -2–3)
BASOPHILS # BLD AUTO: 0.04 K/UL — SIGNIFICANT CHANGE UP (ref 0–0.2)
BASOPHILS NFR BLD AUTO: 1.8 % — SIGNIFICANT CHANGE UP (ref 0–2)
BILIRUB SERPL-MCNC: 1.3 MG/DL — HIGH (ref 0.2–1.2)
BILIRUB UR-MCNC: ABNORMAL
BLD GP AB SCN SERPL QL: NEGATIVE — SIGNIFICANT CHANGE UP
BUN SERPL-MCNC: 19 MG/DL — SIGNIFICANT CHANGE UP (ref 7–23)
BUN SERPL-MCNC: 19 MG/DL — SIGNIFICANT CHANGE UP (ref 7–23)
CA-I SERPL-SCNC: 1.19 MMOL/L — SIGNIFICANT CHANGE UP (ref 1.15–1.33)
CALCIUM SERPL-MCNC: 9.1 MG/DL — SIGNIFICANT CHANGE UP (ref 8.4–10.5)
CALCIUM SERPL-MCNC: 9.9 MG/DL — SIGNIFICANT CHANGE UP (ref 8.4–10.5)
CHLORIDE SERPL-SCNC: 102 MMOL/L — SIGNIFICANT CHANGE UP (ref 96–108)
CHLORIDE SERPL-SCNC: 98 MMOL/L — SIGNIFICANT CHANGE UP (ref 96–108)
CK MB CFR SERPL CALC: <1 NG/ML — SIGNIFICANT CHANGE UP (ref 0–6.7)
CK SERPL-CCNC: SIGNIFICANT CHANGE UP U/L (ref 25–170)
CO2 BLDV-SCNC: 26.7 MMOL/L — HIGH (ref 22–26)
CO2 SERPL-SCNC: 22 MMOL/L — SIGNIFICANT CHANGE UP (ref 22–31)
CO2 SERPL-SCNC: 25 MMOL/L — SIGNIFICANT CHANGE UP (ref 22–31)
COD CRY URNS QL: ABNORMAL /HPF
COLOR SPEC: YELLOW — SIGNIFICANT CHANGE UP
CREAT SERPL-MCNC: 0.61 MG/DL — SIGNIFICANT CHANGE UP (ref 0.5–1.3)
CREAT SERPL-MCNC: 0.67 MG/DL — SIGNIFICANT CHANGE UP (ref 0.5–1.3)
DIFF PNL FLD: ABNORMAL
EGFR: 93 ML/MIN/1.73M2 — SIGNIFICANT CHANGE UP
EGFR: 95 ML/MIN/1.73M2 — SIGNIFICANT CHANGE UP
EOSINOPHIL # BLD AUTO: 0.06 K/UL — SIGNIFICANT CHANGE UP (ref 0–0.5)
EOSINOPHIL NFR BLD AUTO: 2.6 % — SIGNIFICANT CHANGE UP (ref 0–6)
EPI CELLS # UR: SIGNIFICANT CHANGE UP /HPF (ref 0–5)
GAS PNL BLDV: 131 MMOL/L — LOW (ref 136–145)
GAS PNL BLDV: SIGNIFICANT CHANGE UP
GIANT PLATELETS BLD QL SMEAR: PRESENT — SIGNIFICANT CHANGE UP
GLUCOSE SERPL-MCNC: 101 MG/DL — HIGH (ref 70–99)
GLUCOSE SERPL-MCNC: 107 MG/DL — HIGH (ref 70–99)
GLUCOSE UR QL: NEGATIVE — SIGNIFICANT CHANGE UP
HCO3 BLDV-SCNC: 25 MMOL/L — SIGNIFICANT CHANGE UP (ref 22–29)
HCT VFR BLD CALC: 36.5 % — SIGNIFICANT CHANGE UP (ref 34.5–45)
HGB BLD-MCNC: 12.7 G/DL — SIGNIFICANT CHANGE UP (ref 11.5–15.5)
INR BLD: 1.34 — HIGH (ref 0.88–1.16)
KETONES UR-MCNC: ABNORMAL MG/DL
LEUKOCYTE ESTERASE UR-ACNC: ABNORMAL
LYMPHOCYTES # BLD AUTO: 0.79 K/UL — LOW (ref 1–3.3)
LYMPHOCYTES # BLD AUTO: 34.5 % — SIGNIFICANT CHANGE UP (ref 13–44)
MACROCYTES BLD QL: SIGNIFICANT CHANGE UP
MAGNESIUM SERPL-MCNC: 2.1 MG/DL — SIGNIFICANT CHANGE UP (ref 1.6–2.6)
MANUAL SMEAR VERIFICATION: SIGNIFICANT CHANGE UP
MCHC RBC-ENTMCNC: 34.8 GM/DL — SIGNIFICANT CHANGE UP (ref 32–36)
MCHC RBC-ENTMCNC: 37 PG — HIGH (ref 27–34)
MCV RBC AUTO: 106.4 FL — HIGH (ref 80–100)
MONOCYTES # BLD AUTO: 0.54 K/UL — SIGNIFICANT CHANGE UP (ref 0–0.9)
MONOCYTES NFR BLD AUTO: 23.9 % — HIGH (ref 2–14)
NEUTROPHILS # BLD AUTO: 0.73 K/UL — LOW (ref 1.8–7.4)
NEUTROPHILS NFR BLD AUTO: 31.9 % — LOW (ref 43–77)
NITRITE UR-MCNC: NEGATIVE — SIGNIFICANT CHANGE UP
NT-PROBNP SERPL-SCNC: 48 PG/ML — SIGNIFICANT CHANGE UP (ref 0–300)
OVALOCYTES BLD QL SMEAR: SLIGHT — SIGNIFICANT CHANGE UP
PCO2 BLDV: 41 MMHG — SIGNIFICANT CHANGE UP (ref 39–42)
PH BLDV: 7.4 — SIGNIFICANT CHANGE UP (ref 7.32–7.43)
PH UR: 5.5 — SIGNIFICANT CHANGE UP (ref 5–8)
PHOSPHATE SERPL-MCNC: 3 MG/DL — SIGNIFICANT CHANGE UP (ref 2.5–4.5)
PLAT MORPH BLD: NORMAL — SIGNIFICANT CHANGE UP
PLATELET # BLD AUTO: 38 K/UL — LOW (ref 150–400)
PO2 BLDV: <33 MMHG — SIGNIFICANT CHANGE UP (ref 25–45)
POIKILOCYTOSIS BLD QL AUTO: SLIGHT — SIGNIFICANT CHANGE UP
POLYCHROMASIA BLD QL SMEAR: SLIGHT — SIGNIFICANT CHANGE UP
POTASSIUM BLDV-SCNC: 4.5 MMOL/L — SIGNIFICANT CHANGE UP (ref 3.5–5.1)
POTASSIUM SERPL-MCNC: 4.6 MMOL/L — SIGNIFICANT CHANGE UP (ref 3.5–5.3)
POTASSIUM SERPL-MCNC: SIGNIFICANT CHANGE UP MMOL/L (ref 3.5–5.3)
POTASSIUM SERPL-SCNC: 4.6 MMOL/L — SIGNIFICANT CHANGE UP (ref 3.5–5.3)
POTASSIUM SERPL-SCNC: SIGNIFICANT CHANGE UP MMOL/L (ref 3.5–5.3)
PROT SERPL-MCNC: 8.4 G/DL — HIGH (ref 6–8.3)
PROT UR-MCNC: 100 MG/DL
PROTHROM AB SERPL-ACNC: 16 SEC — HIGH (ref 10.5–13.4)
RAPID RVP RESULT: SIGNIFICANT CHANGE UP
RBC # BLD: 3.43 M/UL — LOW (ref 3.8–5.2)
RBC # FLD: 18.6 % — HIGH (ref 10.3–14.5)
RBC BLD AUTO: SIGNIFICANT CHANGE UP
RBC CASTS # UR COMP ASSIST: ABNORMAL /HPF
RH IG SCN BLD-IMP: POSITIVE — SIGNIFICANT CHANGE UP
SAO2 % BLDV: 44.8 % — LOW (ref 67–88)
SARS-COV-2 RNA SPEC QL NAA+PROBE: SIGNIFICANT CHANGE UP
SMUDGE CELLS # BLD: PRESENT — SIGNIFICANT CHANGE UP
SODIUM SERPL-SCNC: 130 MMOL/L — LOW (ref 135–145)
SODIUM SERPL-SCNC: 134 MMOL/L — LOW (ref 135–145)
SP GR SPEC: >=1.03 — SIGNIFICANT CHANGE UP (ref 1–1.03)
SPHEROCYTES BLD QL SMEAR: SIGNIFICANT CHANGE UP
TROPONIN T SERPL-MCNC: <0.01 NG/ML — SIGNIFICANT CHANGE UP (ref 0–0.01)
UROBILINOGEN FLD QL: 0.2 E.U./DL — SIGNIFICANT CHANGE UP
VARIANT LYMPHS # BLD: 5.3 % — SIGNIFICANT CHANGE UP (ref 0–6)
WBC # BLD: 2.28 K/UL — LOW (ref 3.8–10.5)
WBC # FLD AUTO: 2.28 K/UL — LOW (ref 3.8–10.5)
WBC UR QL: < 5 /HPF — SIGNIFICANT CHANGE UP

## 2023-01-16 PROCEDURE — 82803 BLOOD GASES ANY COMBINATION: CPT

## 2023-01-16 PROCEDURE — 83735 ASSAY OF MAGNESIUM: CPT

## 2023-01-16 PROCEDURE — 82330 ASSAY OF CALCIUM: CPT

## 2023-01-16 PROCEDURE — 85610 PROTHROMBIN TIME: CPT

## 2023-01-16 PROCEDURE — 71045 X-RAY EXAM CHEST 1 VIEW: CPT | Mod: 26

## 2023-01-16 PROCEDURE — 87086 URINE CULTURE/COLONY COUNT: CPT

## 2023-01-16 PROCEDURE — 86901 BLOOD TYPING SEROLOGIC RH(D): CPT

## 2023-01-16 PROCEDURE — 84132 ASSAY OF SERUM POTASSIUM: CPT

## 2023-01-16 PROCEDURE — 84484 ASSAY OF TROPONIN QUANT: CPT

## 2023-01-16 PROCEDURE — 93005 ELECTROCARDIOGRAM TRACING: CPT

## 2023-01-16 PROCEDURE — 71045 X-RAY EXAM CHEST 1 VIEW: CPT

## 2023-01-16 PROCEDURE — 82550 ASSAY OF CK (CPK): CPT

## 2023-01-16 PROCEDURE — 82553 CREATINE MB FRACTION: CPT

## 2023-01-16 PROCEDURE — 82962 GLUCOSE BLOOD TEST: CPT

## 2023-01-16 PROCEDURE — 86850 RBC ANTIBODY SCREEN: CPT

## 2023-01-16 PROCEDURE — 80053 COMPREHEN METABOLIC PANEL: CPT

## 2023-01-16 PROCEDURE — 80048 BASIC METABOLIC PNL TOTAL CA: CPT

## 2023-01-16 PROCEDURE — 86900 BLOOD TYPING SEROLOGIC ABO: CPT

## 2023-01-16 PROCEDURE — 87184 SC STD DISK METHOD PER PLATE: CPT

## 2023-01-16 PROCEDURE — 84295 ASSAY OF SERUM SODIUM: CPT

## 2023-01-16 PROCEDURE — 81001 URINALYSIS AUTO W/SCOPE: CPT

## 2023-01-16 PROCEDURE — 0225U NFCT DS DNA&RNA 21 SARSCOV2: CPT

## 2023-01-16 PROCEDURE — 83880 ASSAY OF NATRIURETIC PEPTIDE: CPT

## 2023-01-16 PROCEDURE — 99285 EMERGENCY DEPT VISIT HI MDM: CPT | Mod: 25

## 2023-01-16 PROCEDURE — 99285 EMERGENCY DEPT VISIT HI MDM: CPT

## 2023-01-16 PROCEDURE — 36415 COLL VENOUS BLD VENIPUNCTURE: CPT

## 2023-01-16 PROCEDURE — 85730 THROMBOPLASTIN TIME PARTIAL: CPT

## 2023-01-16 PROCEDURE — 84100 ASSAY OF PHOSPHORUS: CPT

## 2023-01-16 PROCEDURE — 85025 COMPLETE CBC W/AUTO DIFF WBC: CPT

## 2023-01-16 RX ORDER — SODIUM CHLORIDE 9 MG/ML
1000 INJECTION INTRAMUSCULAR; INTRAVENOUS; SUBCUTANEOUS ONCE
Refills: 0 | Status: COMPLETED | OUTPATIENT
Start: 2023-01-16 | End: 2023-01-16

## 2023-01-16 RX ADMIN — SODIUM CHLORIDE 1000 MILLILITER(S): 9 INJECTION INTRAMUSCULAR; INTRAVENOUS; SUBCUTANEOUS at 19:40

## 2023-01-16 NOTE — ED PROVIDER NOTE - OBJECTIVE STATEMENT
72FF c PMH of breast cancer (c mets to liver) with recent discontinuation of hormone tx (ibrance, last dose 2 w/a, stopped due to cytopenia), recently admitted for thrombocytopenia (Nov 2022, s/p transfusion) p/w 1 day hx of generalized weakness. pt reports associated pink-tinged urine x weeks. pt states she does not feel like room is spinning or lightheaded, only generalized weakness. denies f/c/cough/cp/sob/dark or bloody stool. denies bloodthinner use.     collateral information received from Dr. Garcia (pts oncologist) on phone: BM bx performed by Dr. Garcia after pts discharge in Nov 2022 and the BM bx was wnl. pt had transudative lung fluid, ascites, and edema and was started on lasix and spironolactone that was subsequently discontinued.

## 2023-01-16 NOTE — ED PROVIDER NOTE - EYES, MLM
Last Office Visit:  2/5/2021    Last Refill Sent: Naproxen  500 mg Qty 20 2/5/2021    Next Office Visit:  none    
Clear bilaterally,

## 2023-01-16 NOTE — ED ADULT NURSE NOTE - CHIEF COMPLAINT QUOTE
Pt walked in aaox3 c/o dizziness starting this morning. Pt receiving chemo treatment for breast CA, and was told 1x wk ago that her "platelet count was low". Pt endorses seeing blood in the urine for wks.

## 2023-01-16 NOTE — ED PROVIDER NOTE - NSFOLLOWUPINSTRUCTIONS_ED_ALL_ED_FT
1. You were seen for weakness. A copy of any of your resulted labs, imaging, and findings have been provided to you. Make sure to view any test results that may not have yet resulted at the time of your discharge by creating a FollowMyHealth account at: [email or web address] to sign up for FollowMyHealth. Please review all of your lab, imaging, and all other results in their entirety with your primary care doctor.   2. Continue to take your home medications as prescribed.   3. Follow up with your oncologist Dr. Garcia and your primary care doctor within 48 hours to assess the symptoms you were seen for in the emergency department and to review all results from your visit. If you don't have a doctor, call [phone] to make an appointment.  4. Return immediately to the emergency department for new, persistent, or worsening symptoms or signs. Return immediately to the emergency department if you have chest pain, shortness of breath, loss of consciousness, bloody urine, chest pain, or bloody stool.  5. For your for health, you should make healthy food choices and be physically active. Also, you should not smoke or use drugs, and you should not drink alcohol in excess. Please visit [email or web address] for resources and more information.

## 2023-01-16 NOTE — ED PROVIDER NOTE - PATIENT PORTAL LINK FT
You can access the FollowMyHealth Patient Portal offered by [facility] by registering at the following website: [email or web address]. By joining [employer]’s FollowMyHealth portal, you will also be able to view your health information using other applications (apps) compatible with our system.

## 2023-01-16 NOTE — ED ADULT TRIAGE NOTE - CHIEF COMPLAINT QUOTE
Pt walked in aaox3 c/o dizziness starting this morning. Pt receiving chemo treatment for breast CA, and was told 1x wk ago that her "platelet count was low". Pt endorses seeing blood in the urine for wks.
[id number] Comprehensive

## 2023-01-16 NOTE — ED PROVIDER NOTE - PROGRESS NOTE DETAILS
MD Diane: k wnl, Na improved from 130 to 134 after ivf. ua with hematuria no sign of uti. plt 38. discussed pts labs with oncologist Dr Garcia who states pt doesn't need transfusion of plt at this time and can followup with him outpt in office if feeling improved. on reassessment pt states she feels improved and wants to go home will dc

## 2023-01-16 NOTE — ED ADULT NURSE NOTE - OBJECTIVE STATEMENT
Generalized weakness x 2 weeks. Was told she had low platelet count last week and told to come to ED. Hx of breast cancer, actively taking chemo pills. Ambulatory, AAOX4, NAD.

## 2023-01-19 LAB
-  CLINDAMYCIN: SIGNIFICANT CHANGE UP
-  LEVOFLOXACIN: SIGNIFICANT CHANGE UP
CULTURE RESULTS: SIGNIFICANT CHANGE UP
CULTURE RESULTS: SIGNIFICANT CHANGE UP
METHOD TYPE: SIGNIFICANT CHANGE UP
ORGANISM # SPEC MICROSCOPIC CNT: SIGNIFICANT CHANGE UP
ORGANISM # SPEC MICROSCOPIC CNT: SIGNIFICANT CHANGE UP
SPECIMEN SOURCE: SIGNIFICANT CHANGE UP
SPECIMEN SOURCE: SIGNIFICANT CHANGE UP

## 2023-02-07 ENCOUNTER — APPOINTMENT (OUTPATIENT)
Dept: CT IMAGING | Facility: HOSPITAL | Age: 73
End: 2023-02-07

## 2023-02-07 ENCOUNTER — OUTPATIENT (OUTPATIENT)
Dept: OUTPATIENT SERVICES | Facility: HOSPITAL | Age: 73
LOS: 1 days | End: 2023-02-07
Payer: MEDICARE

## 2023-02-07 DIAGNOSIS — Z98.890 OTHER SPECIFIED POSTPROCEDURAL STATES: Chronic | ICD-10-CM

## 2023-02-07 PROCEDURE — 74175 CTA ABDOMEN W/CONTRAST: CPT | Mod: 26,MH

## 2023-02-07 PROCEDURE — 74175 CTA ABDOMEN W/CONTRAST: CPT | Mod: MH

## 2023-02-07 PROCEDURE — 82565 ASSAY OF CREATININE: CPT

## 2023-02-17 ENCOUNTER — OUTPATIENT (OUTPATIENT)
Dept: OUTPATIENT SERVICES | Facility: HOSPITAL | Age: 73
LOS: 1 days | End: 2023-02-17
Payer: MEDICARE

## 2023-02-17 ENCOUNTER — APPOINTMENT (OUTPATIENT)
Dept: INTERVENTIONAL RADIOLOGY/VASCULAR | Facility: HOSPITAL | Age: 73
End: 2023-02-17

## 2023-02-17 DIAGNOSIS — Z98.890 OTHER SPECIFIED POSTPROCEDURAL STATES: Chronic | ICD-10-CM

## 2023-02-17 PROCEDURE — 49083 ABD PARACENTESIS W/IMAGING: CPT

## 2023-02-17 PROCEDURE — P9047: CPT

## 2023-02-17 RX ORDER — ALBUMIN HUMAN 25 %
50 VIAL (ML) INTRAVENOUS ONCE
Refills: 0 | Status: DISCONTINUED | OUTPATIENT
Start: 2023-02-17 | End: 2023-03-04

## 2025-06-05 NOTE — PATIENT PROFILE ADULT - NSPROGENPREVTRANSF_GEN_A_NUR
Assumed care at 0730  A/Ox4, RA  Tele, A fib   Cardiac diet  PO Cardizem  Mg replaced & scheduled K  PIV LFA, SL  Safety measures in place  All needs met at this time     Problem: METABOLIC/FLUID AND ELECTROLYTES - ADULT  Goal: Electrolytes maintained within normal limits  Description: INTERVENTIONS:  - Monitor labs and rhythm and assess patient for signs and symptoms of electrolyte imbalances  - Administer electrolyte replacement as ordered  - Monitor response to electrolyte replacements, including rhythm and repeat lab results as appropriate  - Fluid restriction as ordered  - Instruct patient on fluid and nutrition restrictions as appropriate  Outcome: Progressing  Goal: Hemodynamic stability and optimal renal function maintained  Description: INTERVENTIONS:  - Monitor labs and assess for signs and symptoms of volume excess or deficit  - Monitor intake, output and patient weight  - Monitor urine specific gravity, serum osmolarity and serum sodium as indicated or ordered  - Monitor response to interventions for patient's volume status, including labs, urine output, blood pressure (other measures as available)  - Encourage oral intake as appropriate  - Instruct patient on fluid and nutrition restrictions as appropriate  Outcome: Progressing     Problem: CARDIOVASCULAR - ADULT  Goal: Maintains optimal cardiac output and hemodynamic stability  Description: INTERVENTIONS:  - Monitor vital signs, rhythm, and trends  - Monitor for bleeding, hypotension and signs of decreased cardiac output  - Evaluate effectiveness of vasoactive medications to optimize hemodynamic stability  - Monitor arterial and/or venous puncture sites for bleeding and/or hematoma  - Assess quality of pulses, skin color and temperature  - Assess for signs of decreased coronary artery perfusion - ex. Angina  - Evaluate fluid balance, assess for edema, trend weights  Outcome: Progressing     Problem: CARDIOVASCULAR - ADULT  Goal: Absence of cardiac  arrhythmias or at baseline  Description: INTERVENTIONS:  - Continuous cardiac monitoring, monitor vital signs, obtain 12 lead EKG if indicated  - Evaluate effectiveness of antiarrhythmic and heart rate control medications as ordered  - Initiate emergency measures for life threatening arrhythmias  - Monitor electrolytes and administer replacement therapy as ordered  Outcome: Progressing     Problem: Patient/Family Goals  Goal: Patient/Family Long Term Goal  Description: Patient's Long Term Goal: dc home    Interventions:  - echo  -IV lasix   -I&Os  - See additional Care Plan goals for specific interventions  Outcome: Progressing  Goal: Patient/Family Short Term Goal  Description: Patient's Short Term Goal:     Interventions:   -   - See additional Care Plan goals for specific interventions  Outcome: Progressing      no